# Patient Record
Sex: FEMALE | Race: WHITE | Employment: FULL TIME | URBAN - METROPOLITAN AREA
[De-identification: names, ages, dates, MRNs, and addresses within clinical notes are randomized per-mention and may not be internally consistent; named-entity substitution may affect disease eponyms.]

---

## 2017-06-21 PROCEDURE — 88305 TISSUE EXAM BY PATHOLOGIST: CPT | Performed by: RADIOLOGY

## 2017-11-07 PROCEDURE — 88305 TISSUE EXAM BY PATHOLOGIST: CPT

## 2018-05-14 PROBLEM — J30.1 SEASONAL ALLERGIC RHINITIS DUE TO POLLEN: Status: ACTIVE | Noted: 2018-05-14

## 2018-05-21 PROBLEM — H10.13 ALLERGIC CONJUNCTIVITIS OF BOTH EYES: Status: ACTIVE | Noted: 2018-05-21

## 2018-08-01 PROBLEM — J30.9 ALLERGIC RHINITIS, UNSPECIFIED SEASONALITY, UNSPECIFIED TRIGGER: Status: ACTIVE | Noted: 2018-08-01

## 2018-10-01 PROBLEM — M25.511 ACUTE PAIN OF RIGHT SHOULDER: Status: ACTIVE | Noted: 2018-10-01

## 2018-11-12 PROCEDURE — 86334 IMMUNOFIX E-PHORESIS SERUM: CPT | Performed by: PEDIATRICS

## 2018-11-12 PROCEDURE — 83883 ASSAY NEPHELOMETRY NOT SPEC: CPT | Performed by: PEDIATRICS

## 2018-11-12 PROCEDURE — 84165 PROTEIN E-PHORESIS SERUM: CPT | Performed by: PEDIATRICS

## 2018-11-12 PROCEDURE — 86161 COMPLEMENT/FUNCTION ACTIVITY: CPT | Performed by: PEDIATRICS

## 2018-11-12 PROCEDURE — 82785 ASSAY OF IGE: CPT | Performed by: PEDIATRICS

## 2018-11-12 PROCEDURE — 86003 ALLG SPEC IGE CRUDE XTRC EA: CPT | Performed by: PEDIATRICS

## 2018-11-12 PROCEDURE — 83520 IMMUNOASSAY QUANT NOS NONAB: CPT | Performed by: PEDIATRICS

## 2018-11-12 PROCEDURE — 86160 COMPLEMENT ANTIGEN: CPT | Performed by: PEDIATRICS

## 2018-12-03 PROCEDURE — 86003 ALLG SPEC IGE CRUDE XTRC EA: CPT | Performed by: PEDIATRICS

## 2018-12-03 PROCEDURE — 36415 COLL VENOUS BLD VENIPUNCTURE: CPT | Performed by: PEDIATRICS

## 2020-11-20 PROCEDURE — 88305 TISSUE EXAM BY PATHOLOGIST: CPT | Performed by: INTERNAL MEDICINE

## 2020-11-23 PROBLEM — D12.3 ADENOMATOUS POLYP OF TRANSVERSE COLON: Status: ACTIVE | Noted: 2020-11-23

## 2021-02-16 PROBLEM — N39.0 POSTCOITAL UTI: Status: ACTIVE | Noted: 2021-02-16

## 2021-02-16 PROBLEM — J45.40 MODERATE PERSISTENT ASTHMA WITHOUT COMPLICATION (HCC): Status: ACTIVE | Noted: 2021-02-16

## 2021-02-16 PROBLEM — J45.40 MODERATE PERSISTENT ASTHMA WITHOUT COMPLICATION: Status: ACTIVE | Noted: 2021-02-16

## 2024-02-21 ENCOUNTER — NURSE ONLY (OUTPATIENT)
Dept: HEMATOLOGY/ONCOLOGY | Facility: HOSPITAL | Age: 54
End: 2024-02-21
Attending: GENETIC COUNSELOR, MS
Payer: COMMERCIAL

## 2024-02-21 ENCOUNTER — GENETICS ENCOUNTER (OUTPATIENT)
Dept: GENETICS | Facility: HOSPITAL | Age: 54
End: 2024-02-21
Attending: GENETIC COUNSELOR, MS
Payer: COMMERCIAL

## 2024-02-21 DIAGNOSIS — Z80.3 FAMILY HISTORY OF BREAST CANCER IN FIRST DEGREE RELATIVE: Primary | ICD-10-CM

## 2024-02-21 DIAGNOSIS — Z80.42 FAMILY HISTORY OF MALIGNANT NEOPLASM OF PROSTATE: ICD-10-CM

## 2024-02-21 DIAGNOSIS — Z84.81 FAMILY HISTORY OF GENETIC DISEASE CARRIER: ICD-10-CM

## 2024-02-21 PROCEDURE — 36415 COLL VENOUS BLD VENIPUNCTURE: CPT

## 2024-02-21 PROCEDURE — 96040 HC GENETIC COUNSELING EA 30 MIN: CPT | Performed by: GENETIC COUNSELOR, MS

## 2024-02-22 PROBLEM — Z84.81 FAMILY HISTORY OF GENETIC DISEASE CARRIER: Status: ACTIVE | Noted: 2024-02-22

## 2024-02-22 PROBLEM — Z80.42 FAMILY HISTORY OF MALIGNANT NEOPLASM OF PROSTATE: Status: ACTIVE | Noted: 2024-02-22

## 2024-02-22 NOTE — PROGRESS NOTES
Reason for visit: Mrs. Juan is a 53-year-old woman who has a family history of a BRCA1 or BRCA2 mutation which was identified in 2006.  She was tested for this known mutation and was found to be negative but more recently, her brother had a testing panel and was found to harbor a CHEK2 mutation.  She has a significant history of cancer on both sides of her family.  She is now interested in pursuing genetic testing for the familial mutation in order to guide her medical care. Mrs. Juan is  and is employed as a pharmacist.  She and her  have four adult children together.  Relevant family history:  Mrs. Juan shares that one of her sisters was diagnosed with breast cancer in 2003, at age 42, and underwent genetic testing.  Her father was also found to have prostate cancer around this time and both her father and this sister had genetic testing for BRCA1/2 through [facility] in ~2004 and a VUS was identified.  This was upgraded in 2006 and Mrs. Juan and other family members elected to have site-specific testing at this point which identified that two other brothers had the mutation and that she was negative.  More recently, a brother with the known BRCA mutation was diagnosed with prostate cancer and was tested with a panel test which yielded a CHEK2 mutation in addition to the BRCA mutation.  Unfortunately, no reports were available for review, but she is a very good historian.  The cancers on her paternal side consistent with the BRCA mutation include her paternal grandmother who passed away from breast cancer in her 40's and two paternal great aunts (sisters of her grandmother) who passed away from malignancies; one with breast cancer and one with ovarian cancer.  In addition to her sister with breast cancer diagnosed at age 42 and her brother with prostate cancer, another sister was diagnosed with breast cancer at age 46 (negative for the familial BRCA mutation) and a niece has recently been  diagnosed with breast cancer at age 25 and is BRCA+.  On her maternal side, a maternal cousin passed away from liver cancer and another was diagnosed with colon cancer at age 50 and a maternal aunt was diagnosed with colon cancer.  Aside from the aforementioned genetic testing, she is not aware of any other family members who have pursued testing to date. Her heritage is Faroese on both sides of her family and is unaware of any Ashkenazi Evangelical heritage or consanguinity.  All four of her children (two daughters and two sons) are healthy and well by her report.  Medical history: Mrs. Juan reports that she is in good health.  She has her ovaries intact and is post-menopausal, having undergone a hysterectomy at age 38.  She has had a colonoscopy with several benign polyps removed.  She has been consistent with her breast imaging and has a history of 5 or 6 benign breast biopsies.  She does not have any current breast-related complaints.  She denies any history of thyroid issues or dermatological concerns but does have a personal history of uterine fibroids.  She was 12 at menarche and was 28 at the birth of her eldest children (twins).  She denies any use of hormone replacement therapy but does admit to 10 years of oral contraceptive use.    Summary: We discussed hereditary breast cancer with Mrs. Juan because of her family history.  Most breast and/or ovarian cancer is not hereditary; however, hereditary cancer is suspected under certain conditions, such as when breast cancer occurs prior to the age of 50 (or premenopausal), when there are multiple affected family members, when breast cancer occurs in combination with other cancers, especially ovarian cancer, and when cancer appears to be passing from generation to generation, or when an individual has more than one cancer diagnosed.  We reviewed how much has changed with genetic testing since her family was originally tested, especially with panels becoming  available in 2014.  We discussed dominant inheritance, the pattern in which most hereditary cancer conditions are inherited.  If an individual has such a cancer predisposing gene mutation, there is a 50% chance that any offspring will not inherit the mutation and a 50% chance that he or she will inherit it.  Inheriting the mutation does not automatically mean that one will develop cancer, rather that there is an increased chance for developing certain types of cancer.  Cancer predisposing gene mutations can exist in males and females and can be passed on to both male and female offspring.  Given the available information about her brother with a reported CHEK2 mutation, this gives her a 50% chance of being a carrier of the same mutation.    This information would directly impact her medical management, as she has her breast tissue remaining and her ovaries are intact.  The pros and cons of cancer predisposing gene mutation testing were reviewed with Mrs. Juan.  Genetic test results can have significant impact on medical management, planning, screening, surgical decision-making, cancer risk assessment for the patient and relatives, and psychological/emotional well-being.  Mutations in the genes BRCA1 and BRCA2 account for most (but not all) cases of hereditary breast or ovarian cancer.  Mutations in other genes have also been associated with an increased risk for breast cancer - but mutations in these other genes are statistically less often seen in hereditary breast cancer.    We discussed the benefits and limitations of testing for only the known familial CHEK2 mutation vs. comprehensive CHEK2 testing versus multi-gene panels that include CHEK2.  Panels are an appropriate option for individuals whose history is suggestive of more than one syndrome, and they improve detection rate for identifying the underlying cause of a hereditary cancer.  Limitations of panels include an unknown percentage of variants of  unknown significance, as well as an uncertainty of level of risk associated with certain unknown-penetrance genes, and therefore lack of clear guidelines for risk management of carriers of some of these mutations.  There are panels that assess for mutations in only “high risk” and clinically actionable breast cancer genes as well as larger panels that assess for mutations in high, moderate and unknown-penetrance cancer genes.  Genetic testing for either comprehensive CHEK2 analysis or a panel could yield one of three results: no mutation detected, deleterious mutation detected, or variant of uncertain significance.  The implications of these potential results were reviewed with Mrs. Juan.  Conversely, testing for only the known familial mutation would yield either a positive (mutation detected) or negative (no mutation detected) result. However, as she was unable to provide a report from her brother, minimally, CHEK2 comprehensive testing should be performed.    Additionally, we discussed the federal statutes protecting genetic information and non-discrimination for health insurance or employment (LEON) but we reviewed that life insurance, long term healthcare and disability benefits are not covered by these laws, and therefore, should she be interested in obtaining coverage for any of these, she should do this prior to the testing.  Given the reported familial CHEK2 mutation and a significant maternal family history of early-onset colon cancer, Mrs. Juan meets NCCN criteria for genetic testing.  After discussing the multiple testing options, Mrs. Juan decided that she would like to pursue testing for a comprehensive panel of testing with RNA analysis ([facility] Multi-Cancer Panel+RNA, 70 genes).  The blood was drawn today and sent to [facility].  Turn-around-time is approximately one to two weeks for the testing.  Our office will call Mrs. Juan as soon as results are received; post-test counseling can be  scheduled at that time.   Given the known familial BRCA mutation, resources for her family were shared including [facility] Cueto, [facility] and [facility].    Plan:  Ordered [facility] Multi[facility] Panel+RNA through [facility].  The Genetics office will call Mrs. Juan when results are received.  Post-test counseling can be scheduled at that time.  Recommendations for Mrs. Juan and family members will depend on above test results.  Thank you for referring Mrs. Juan for genetic counseling; please do not hesitate to contact our office if you have any questions or concerns, [phone].  Send to: Srinivas Butts MD  Time spent with patient: 60 minutes

## 2024-02-29 ENCOUNTER — TELEPHONE (OUTPATIENT)
Dept: GENETICS | Facility: HOSPITAL | Age: 54
End: 2024-02-29

## 2024-02-29 NOTE — TELEPHONE ENCOUNTER
Shared genetic testing results with Melyssa, which are negative for 70 gene panel+RNA aside from VUS in POLE (c.[id number]>T), which will not change her clinical management. She does not have the familial BRCA2 mutation or the familial CHEK2 mutation. Testing was performed by [facility] ([facility] Multi-Cancer Panel+RNA). Released results to her through lab's portal and will mail her a copy. Encouraged her to reach out to me with any questions. All her questions were answered to the best of my ability and she was appreciative of the call and reassured by the results.

## 2025-04-23 ENCOUNTER — LABORATORY ENCOUNTER (OUTPATIENT)
Dept: LAB | Age: 55
End: 2025-04-23
Attending: SURGERY
Payer: COMMERCIAL

## 2025-04-23 DIAGNOSIS — K57.92 DIVERTICULITIS: ICD-10-CM

## 2025-04-23 LAB
ANION GAP SERPL CALC-SCNC: 10 MMOL/L (ref 0–18)
BUN BLD-MCNC: 15 MG/DL (ref 9–23)
CALCIUM BLD-MCNC: 9.6 MG/DL (ref 8.7–10.6)
CHLORIDE SERPL-SCNC: 106 MMOL/L (ref 98–112)
CO2 SERPL-SCNC: 25 MMOL/L (ref 21–32)
CREAT BLD-MCNC: 0.88 MG/DL (ref 0.55–1.02)
EGFRCR SERPLBLD CKD-EPI 2021: 78 ML/MIN/1.73M2 (ref 60–?)
ERYTHROCYTE [DISTWIDTH] IN BLOOD BY AUTOMATED COUNT: 13 %
FASTING STATUS PATIENT QL REPORTED: YES
GLUCOSE BLD-MCNC: 86 MG/DL (ref 70–99)
HCT VFR BLD AUTO: 40.4 % (ref 35–48)
HGB BLD-MCNC: 13.2 G/DL (ref 12–16)
MCH RBC QN AUTO: 28.9 PG (ref 26–34)
MCHC RBC AUTO-ENTMCNC: 32.7 G/DL (ref 31–37)
MCV RBC AUTO: 88.4 FL (ref 80–100)
OSMOLALITY SERPL CALC.SUM OF ELEC: 292 MOSM/KG (ref 275–295)
PLATELET # BLD AUTO: 172 10(3)UL (ref 150–450)
POTASSIUM SERPL-SCNC: 4.4 MMOL/L (ref 3.5–5.1)
RBC # BLD AUTO: 4.57 X10(6)UL (ref 3.8–5.3)
SODIUM SERPL-SCNC: 141 MMOL/L (ref 136–145)
WBC # BLD AUTO: 5.3 X10(3) UL (ref 4–11)

## 2025-04-23 PROCEDURE — 36415 COLL VENOUS BLD VENIPUNCTURE: CPT

## 2025-04-23 PROCEDURE — 80048 BASIC METABOLIC PNL TOTAL CA: CPT

## 2025-04-23 PROCEDURE — 85027 COMPLETE CBC AUTOMATED: CPT

## 2025-04-30 ENCOUNTER — HOSPITAL ENCOUNTER (INPATIENT)
Facility: HOSPITAL | Age: 55
LOS: 2 days | Discharge: HOME | DRG: 330 | End: 2025-05-02
Attending: SURGERY | Admitting: SURGERY
Payer: COMMERCIAL

## 2025-04-30 ENCOUNTER — ANESTHESIA EVENT (OUTPATIENT)
Dept: SURGERY | Facility: HOSPITAL | Age: 55
DRG: 330 | End: 2025-04-30
Payer: COMMERCIAL

## 2025-04-30 ENCOUNTER — ANESTHESIA (OUTPATIENT)
Dept: SURGERY | Facility: HOSPITAL | Age: 55
DRG: 330 | End: 2025-04-30
Payer: COMMERCIAL

## 2025-04-30 DIAGNOSIS — K57.92 DIVERTICULITIS: Primary | ICD-10-CM

## 2025-04-30 LAB — GLUCOSE BLD-MCNC: 260 MG/DL (ref 70–99)

## 2025-04-30 PROCEDURE — 82962 GLUCOSE BLOOD TEST: CPT

## 2025-04-30 PROCEDURE — 0DTNFZZ RESECTION OF SIGMOID COLON, VIA NATURAL OR ARTIFICIAL OPENING WITH PERCUTANEOUS ENDOSCOPIC ASSISTANCE: ICD-10-PCS | Performed by: SURGERY

## 2025-04-30 PROCEDURE — 8E0Y4CZ ROBOTIC ASSISTED PROCEDURE OF LOWER EXTREMITY, PERCUTANEOUS ENDOSCOPIC APPROACH: ICD-10-PCS | Performed by: SURGERY

## 2025-04-30 PROCEDURE — 88307 TISSUE EXAM BY PATHOLOGIST: CPT | Performed by: SURGERY

## 2025-04-30 RX ORDER — HYDROCODONE BITARTRATE AND ACETAMINOPHEN 5; 325 MG/1; MG/1
1 TABLET ORAL ONCE AS NEEDED
Status: DISCONTINUED | OUTPATIENT
Start: 2025-04-30 | End: 2025-04-30 | Stop reason: HOSPADM

## 2025-04-30 RX ORDER — HYDROMORPHONE HYDROCHLORIDE 1 MG/ML
0.4 INJECTION, SOLUTION INTRAMUSCULAR; INTRAVENOUS; SUBCUTANEOUS EVERY 5 MIN PRN
Status: DISCONTINUED | OUTPATIENT
Start: 2025-04-30 | End: 2025-04-30 | Stop reason: HOSPADM

## 2025-04-30 RX ORDER — MIDAZOLAM HYDROCHLORIDE 1 MG/ML
INJECTION INTRAMUSCULAR; INTRAVENOUS
Status: COMPLETED
Start: 2025-04-30 | End: 2025-04-30

## 2025-04-30 RX ORDER — FAMOTIDINE 20 MG/1
20 TABLET, FILM COATED ORAL 2 TIMES DAILY
Status: DISCONTINUED | OUTPATIENT
Start: 2025-04-30 | End: 2025-05-02

## 2025-04-30 RX ORDER — ENOXAPARIN SODIUM 100 MG/ML
40 INJECTION SUBCUTANEOUS DAILY
Status: DISCONTINUED | OUTPATIENT
Start: 2025-05-01 | End: 2025-05-02

## 2025-04-30 RX ORDER — ACETAMINOPHEN 500 MG
1000 TABLET ORAL EVERY 8 HOURS PRN
Status: DISCONTINUED | OUTPATIENT
Start: 2025-04-30 | End: 2025-05-01

## 2025-04-30 RX ORDER — SENNOSIDES 8.6 MG
17.2 TABLET ORAL NIGHTLY PRN
Status: DISCONTINUED | OUTPATIENT
Start: 2025-04-30 | End: 2025-05-02

## 2025-04-30 RX ORDER — MEPERIDINE HYDROCHLORIDE 25 MG/ML
12.5 INJECTION INTRAMUSCULAR; INTRAVENOUS; SUBCUTANEOUS AS NEEDED
Status: DISCONTINUED | OUTPATIENT
Start: 2025-04-30 | End: 2025-04-30 | Stop reason: HOSPADM

## 2025-04-30 RX ORDER — ONDANSETRON 2 MG/ML
INJECTION INTRAMUSCULAR; INTRAVENOUS AS NEEDED
Status: DISCONTINUED | OUTPATIENT
Start: 2025-04-30 | End: 2025-04-30 | Stop reason: SURG

## 2025-04-30 RX ORDER — NEOSTIGMINE METHYLSULFATE 1 MG/ML
INJECTION INTRAVENOUS AS NEEDED
Status: DISCONTINUED | OUTPATIENT
Start: 2025-04-30 | End: 2025-04-30 | Stop reason: SURG

## 2025-04-30 RX ORDER — HEPARIN SODIUM 5000 [USP'U]/ML
INJECTION, SOLUTION INTRAVENOUS; SUBCUTANEOUS
Status: DISPENSED
Start: 2025-04-30 | End: 2025-04-30

## 2025-04-30 RX ORDER — ROCURONIUM BROMIDE 10 MG/ML
INJECTION, SOLUTION INTRAVENOUS AS NEEDED
Status: DISCONTINUED | OUTPATIENT
Start: 2025-04-30 | End: 2025-04-30 | Stop reason: SURG

## 2025-04-30 RX ORDER — NALOXONE HYDROCHLORIDE 0.4 MG/ML
0.08 INJECTION, SOLUTION INTRAMUSCULAR; INTRAVENOUS; SUBCUTANEOUS AS NEEDED
Status: DISCONTINUED | OUTPATIENT
Start: 2025-04-30 | End: 2025-04-30 | Stop reason: HOSPADM

## 2025-04-30 RX ORDER — SODIUM CHLORIDE, SODIUM LACTATE, POTASSIUM CHLORIDE, CALCIUM CHLORIDE 600; 310; 30; 20 MG/100ML; MG/100ML; MG/100ML; MG/100ML
INJECTION, SOLUTION INTRAVENOUS CONTINUOUS
Status: DISCONTINUED | OUTPATIENT
Start: 2025-04-30 | End: 2025-04-30 | Stop reason: HOSPADM

## 2025-04-30 RX ORDER — ACETAMINOPHEN 500 MG
1000 TABLET ORAL ONCE AS NEEDED
Status: DISCONTINUED | OUTPATIENT
Start: 2025-04-30 | End: 2025-04-30 | Stop reason: HOSPADM

## 2025-04-30 RX ORDER — ONDANSETRON 2 MG/ML
INJECTION INTRAMUSCULAR; INTRAVENOUS
Status: COMPLETED
Start: 2025-04-30 | End: 2025-04-30

## 2025-04-30 RX ORDER — HYDROMORPHONE HYDROCHLORIDE 1 MG/ML
0.2 INJECTION, SOLUTION INTRAMUSCULAR; INTRAVENOUS; SUBCUTANEOUS EVERY 5 MIN PRN
Status: DISCONTINUED | OUTPATIENT
Start: 2025-04-30 | End: 2025-04-30 | Stop reason: HOSPADM

## 2025-04-30 RX ORDER — BUPIVACAINE HYDROCHLORIDE 5 MG/ML
INJECTION, SOLUTION EPIDURAL; INTRACAUDAL; PERINEURAL AS NEEDED
Status: DISCONTINUED | OUTPATIENT
Start: 2025-04-30 | End: 2025-04-30 | Stop reason: HOSPADM

## 2025-04-30 RX ORDER — DIPHENHYDRAMINE HYDROCHLORIDE 50 MG/ML
12.5 INJECTION, SOLUTION INTRAMUSCULAR; INTRAVENOUS AS NEEDED
Status: DISCONTINUED | OUTPATIENT
Start: 2025-04-30 | End: 2025-04-30 | Stop reason: HOSPADM

## 2025-04-30 RX ORDER — HYDROMORPHONE HYDROCHLORIDE 1 MG/ML
0.4 INJECTION, SOLUTION INTRAMUSCULAR; INTRAVENOUS; SUBCUTANEOUS EVERY 2 HOUR PRN
Status: DISCONTINUED | OUTPATIENT
Start: 2025-04-30 | End: 2025-05-02

## 2025-04-30 RX ORDER — LIDOCAINE HYDROCHLORIDE ANHYDROUS AND DEXTROSE MONOHYDRATE .8; 5 G/100ML; G/100ML
INJECTION, SOLUTION INTRAVENOUS CONTINUOUS PRN
Status: DISCONTINUED | OUTPATIENT
Start: 2025-04-30 | End: 2025-04-30 | Stop reason: SURG

## 2025-04-30 RX ORDER — HEPARIN SODIUM 5000 [USP'U]/ML
5000 INJECTION, SOLUTION INTRAVENOUS; SUBCUTANEOUS ONCE
Status: COMPLETED | OUTPATIENT
Start: 2025-04-30 | End: 2025-04-30

## 2025-04-30 RX ORDER — SODIUM CHLORIDE, SODIUM LACTATE, POTASSIUM CHLORIDE, CALCIUM CHLORIDE 600; 310; 30; 20 MG/100ML; MG/100ML; MG/100ML; MG/100ML
1 INJECTION, SOLUTION INTRAVENOUS CONTINUOUS
Status: DISCONTINUED | OUTPATIENT
Start: 2025-04-30 | End: 2025-05-02

## 2025-04-30 RX ORDER — PROCHLORPERAZINE EDISYLATE 5 MG/ML
5 INJECTION INTRAMUSCULAR; INTRAVENOUS EVERY 8 HOURS PRN
Status: DISCONTINUED | OUTPATIENT
Start: 2025-04-30 | End: 2025-04-30 | Stop reason: HOSPADM

## 2025-04-30 RX ORDER — ONDANSETRON 2 MG/ML
4 INJECTION INTRAMUSCULAR; INTRAVENOUS EVERY 6 HOURS PRN
Status: DISCONTINUED | OUTPATIENT
Start: 2025-04-30 | End: 2025-04-30 | Stop reason: HOSPADM

## 2025-04-30 RX ORDER — HYDROCODONE BITARTRATE AND ACETAMINOPHEN 5; 325 MG/1; MG/1
2 TABLET ORAL ONCE AS NEEDED
Status: DISCONTINUED | OUTPATIENT
Start: 2025-04-30 | End: 2025-04-30 | Stop reason: HOSPADM

## 2025-04-30 RX ORDER — DEXAMETHASONE SODIUM PHOSPHATE 4 MG/ML
VIAL (ML) INJECTION AS NEEDED
Status: DISCONTINUED | OUTPATIENT
Start: 2025-04-30 | End: 2025-04-30 | Stop reason: SURG

## 2025-04-30 RX ORDER — HYDROMORPHONE HYDROCHLORIDE 1 MG/ML
0.6 INJECTION, SOLUTION INTRAMUSCULAR; INTRAVENOUS; SUBCUTANEOUS EVERY 5 MIN PRN
Status: DISCONTINUED | OUTPATIENT
Start: 2025-04-30 | End: 2025-04-30 | Stop reason: HOSPADM

## 2025-04-30 RX ORDER — METRONIDAZOLE 500 MG/100ML
500 INJECTION, SOLUTION INTRAVENOUS ONCE
Status: COMPLETED | OUTPATIENT
Start: 2025-04-30 | End: 2025-04-30

## 2025-04-30 RX ORDER — HYDROMORPHONE HYDROCHLORIDE 1 MG/ML
0.8 INJECTION, SOLUTION INTRAMUSCULAR; INTRAVENOUS; SUBCUTANEOUS EVERY 2 HOUR PRN
Status: DISCONTINUED | OUTPATIENT
Start: 2025-04-30 | End: 2025-05-02

## 2025-04-30 RX ORDER — OXYCODONE HYDROCHLORIDE 5 MG/1
5 TABLET ORAL EVERY 4 HOURS PRN
Status: DISCONTINUED | OUTPATIENT
Start: 2025-04-30 | End: 2025-05-02

## 2025-04-30 RX ORDER — MAGNESIUM OXIDE 400 MG/1
400 TABLET ORAL DAILY
Status: DISCONTINUED | OUTPATIENT
Start: 2025-04-30 | End: 2025-05-02

## 2025-04-30 RX ORDER — ACETAMINOPHEN 500 MG
1000 TABLET ORAL ONCE
Status: DISCONTINUED | OUTPATIENT
Start: 2025-04-30 | End: 2025-04-30 | Stop reason: HOSPADM

## 2025-04-30 RX ORDER — ONDANSETRON 2 MG/ML
4 INJECTION INTRAMUSCULAR; INTRAVENOUS EVERY 6 HOURS PRN
Status: DISCONTINUED | OUTPATIENT
Start: 2025-04-30 | End: 2025-05-02

## 2025-04-30 RX ORDER — GLYCOPYRROLATE 0.2 MG/ML
INJECTION, SOLUTION INTRAMUSCULAR; INTRAVENOUS AS NEEDED
Status: DISCONTINUED | OUTPATIENT
Start: 2025-04-30 | End: 2025-04-30 | Stop reason: SURG

## 2025-04-30 RX ORDER — HYDROMORPHONE HYDROCHLORIDE 1 MG/ML
INJECTION, SOLUTION INTRAMUSCULAR; INTRAVENOUS; SUBCUTANEOUS
Status: COMPLETED
Start: 2025-04-30 | End: 2025-04-30

## 2025-04-30 RX ORDER — MIDAZOLAM HYDROCHLORIDE 1 MG/ML
1 INJECTION INTRAMUSCULAR; INTRAVENOUS EVERY 5 MIN PRN
Status: DISCONTINUED | OUTPATIENT
Start: 2025-04-30 | End: 2025-04-30 | Stop reason: HOSPADM

## 2025-04-30 RX ORDER — VANCOMYCIN HYDROCHLORIDE
15 ONCE
Status: COMPLETED | OUTPATIENT
Start: 2025-04-30 | End: 2025-04-30

## 2025-04-30 RX ORDER — LABETALOL HYDROCHLORIDE 5 MG/ML
5 INJECTION, SOLUTION INTRAVENOUS EVERY 5 MIN PRN
Status: DISCONTINUED | OUTPATIENT
Start: 2025-04-30 | End: 2025-04-30 | Stop reason: HOSPADM

## 2025-04-30 RX ORDER — FAMOTIDINE 10 MG/ML
20 INJECTION, SOLUTION INTRAVENOUS 2 TIMES DAILY
Status: DISCONTINUED | OUTPATIENT
Start: 2025-04-30 | End: 2025-05-02

## 2025-04-30 RX ORDER — SODIUM CHLORIDE 9 MG/ML
INJECTION, SOLUTION INTRAVENOUS CONTINUOUS
Status: DISCONTINUED | OUTPATIENT
Start: 2025-04-30 | End: 2025-05-02

## 2025-04-30 RX ORDER — OXYCODONE HYDROCHLORIDE 10 MG/1
10 TABLET ORAL EVERY 4 HOURS PRN
Status: DISCONTINUED | OUTPATIENT
Start: 2025-04-30 | End: 2025-05-02

## 2025-04-30 RX ORDER — LIDOCAINE HYDROCHLORIDE 10 MG/ML
INJECTION, SOLUTION INFILTRATION; PERINEURAL AS NEEDED
Status: DISCONTINUED | OUTPATIENT
Start: 2025-04-30 | End: 2025-04-30 | Stop reason: HOSPADM

## 2025-04-30 RX ORDER — METOCLOPRAMIDE HYDROCHLORIDE 5 MG/ML
INJECTION INTRAMUSCULAR; INTRAVENOUS AS NEEDED
Status: DISCONTINUED | OUTPATIENT
Start: 2025-04-30 | End: 2025-04-30 | Stop reason: SURG

## 2025-04-30 RX ORDER — INDOCYANINE GREEN AND WATER 25 MG
KIT INJECTION AS NEEDED
Status: DISCONTINUED | OUTPATIENT
Start: 2025-04-30 | End: 2025-04-30 | Stop reason: SURG

## 2025-04-30 RX ORDER — POLYETHYLENE GLYCOL 3350 17 G/17G
17 POWDER, FOR SOLUTION ORAL DAILY PRN
Status: DISCONTINUED | OUTPATIENT
Start: 2025-04-30 | End: 2025-05-02

## 2025-04-30 RX ORDER — MIDAZOLAM HYDROCHLORIDE 1 MG/ML
INJECTION INTRAMUSCULAR; INTRAVENOUS AS NEEDED
Status: DISCONTINUED | OUTPATIENT
Start: 2025-04-30 | End: 2025-04-30 | Stop reason: SURG

## 2025-04-30 RX ORDER — LIDOCAINE HYDROCHLORIDE 10 MG/ML
INJECTION, SOLUTION EPIDURAL; INFILTRATION; INTRACAUDAL; PERINEURAL AS NEEDED
Status: DISCONTINUED | OUTPATIENT
Start: 2025-04-30 | End: 2025-04-30 | Stop reason: SURG

## 2025-04-30 RX ORDER — KETOROLAC TROMETHAMINE 30 MG/ML
30 INJECTION, SOLUTION INTRAMUSCULAR; INTRAVENOUS EVERY 6 HOURS
Status: DISCONTINUED | OUTPATIENT
Start: 2025-04-30 | End: 2025-05-01

## 2025-04-30 RX ADMIN — ONDANSETRON 4 MG: 2 INJECTION INTRAMUSCULAR; INTRAVENOUS at 08:03:00

## 2025-04-30 RX ADMIN — LIDOCAINE HYDROCHLORIDE 5 ML: 10 INJECTION, SOLUTION EPIDURAL; INFILTRATION; INTRACAUDAL; PERINEURAL at 07:39:00

## 2025-04-30 RX ADMIN — NEOSTIGMINE METHYLSULFATE 3 MG: 1 INJECTION INTRAVENOUS at 09:13:00

## 2025-04-30 RX ADMIN — METRONIDAZOLE 500 MG: 500 INJECTION, SOLUTION INTRAVENOUS at 07:42:00

## 2025-04-30 RX ADMIN — SODIUM CHLORIDE: 9 INJECTION, SOLUTION INTRAVENOUS at 07:33:00

## 2025-04-30 RX ADMIN — INDOCYANINE GREEN AND WATER 7.5 MG: 25 MG KIT INJECTION at 08:39:00

## 2025-04-30 RX ADMIN — DEXAMETHASONE SODIUM PHOSPHATE 4 MG: 4 MG/ML VIAL (ML) INJECTION at 07:42:00

## 2025-04-30 RX ADMIN — ROCURONIUM BROMIDE 10 MG: 10 INJECTION, SOLUTION INTRAVENOUS at 08:25:00

## 2025-04-30 RX ADMIN — MIDAZOLAM HYDROCHLORIDE 2 MG: 1 INJECTION INTRAMUSCULAR; INTRAVENOUS at 07:33:00

## 2025-04-30 RX ADMIN — SODIUM CHLORIDE: 9 INJECTION, SOLUTION INTRAVENOUS at 09:13:00

## 2025-04-30 RX ADMIN — LIDOCAINE HYDROCHLORIDE ANHYDROUS AND DEXTROSE MONOHYDRATE 2 MG/KG/HR: .8; 5 INJECTION, SOLUTION INTRAVENOUS at 07:50:00

## 2025-04-30 RX ADMIN — VANCOMYCIN HYDROCHLORIDE 1.25 G: at 07:33:00

## 2025-04-30 RX ADMIN — GLYCOPYRROLATE 0.4 MG: 0.2 INJECTION, SOLUTION INTRAMUSCULAR; INTRAVENOUS at 09:13:00

## 2025-04-30 RX ADMIN — ROCURONIUM BROMIDE 50 MG: 10 INJECTION, SOLUTION INTRAVENOUS at 07:39:00

## 2025-04-30 RX ADMIN — METOCLOPRAMIDE HYDROCHLORIDE 10 MG: 5 INJECTION INTRAMUSCULAR; INTRAVENOUS at 09:05:00

## 2025-04-30 NOTE — ANESTHESIA POSTPROCEDURE EVALUATION
[place]    Melyssa Juan Patient Status:  Inpatient   Age/Gender 54 year old female MRN [id number]   Location [facility] 3NW-A Attending Haris Morrison MD   Hosp Day # 0 PCP Adan Samano MD       Anesthesia Post-op Note    XI ROBOT-ASSISTED  LOW ANTERIOR COLON RESECTION, RIGID PROCTOSIGMOIDOSCOPY    Procedure Summary       Date: 04/30/25 Room / Location:  MAIN OR 09 /  MAIN OR    Anesthesia Start: 0733 Anesthesia Stop: 0923    Procedure: XI ROBOT-ASSISTED  LOW ANTERIOR COLON RESECTION, RIGID PROCTOSIGMOIDOSCOPY (Abdomen) Diagnosis: (DIVERTICULITIS)    Surgeons: Haris Morrison MD Anesthesiologist: Joel Mike MD    Anesthesia Type: general ASA Status: 2            Anesthesia Type: general    Vitals Value Taken Time   /72 04/30/25 10:24   Temp 99.2 °F (37.3 °C) 04/30/25 10:00   Pulse 76 04/30/25 10:32   Resp 7 04/30/25 10:32   SpO2 94 % 04/30/25 10:32   Vitals shown include unfiled device data.        Patient Location: PACU    Anesthesia Type: general    Airway Patency: patent and extubated    Postop Pain Control: adequate    Mental Status: preanesthetic baseline    Nausea/Vomiting: none    Cardiopulmonary/Hydration status: stable euvolemic    Complications: no apparent anesthesia related complications    Postop vital signs: stable    Dental Exam: Unchanged from Preop    Patient to be discharged from PACU when criteria met.

## 2025-04-30 NOTE — H&P
H&P Notes  - documented in this encounter   Haris Morrison MD - 2025 11:45 AM CST  Formatting of this note is different from the original.  Melyssa Juan is a 54 year old female. Patient presents with:  New Patient: Diverticulitis      No ref. provider found    HPI:  Patient presents with several episodes of acute diverticulitis involving her sigmoid colon. She has had 4 episodes in the last 2 years. She most recently had a severe episode requiring antibiotics for several days. She underwent a CT scan on 2025 and was found of acute diverticulitis of the sigmoid colon. She is feeling better after a weeks worth of oral antibiotics. Last colonoscopy was several years ago.    Past Medical History:  Diagnosis Date  Adenomatous polyp of transverse colon 2020  Colonoscopy 2020  Allergic rhinitis 08/10/2012  Asthma  ALLERGY INDUCED  BRCA1 negative [id number]  BRCA2 negative [id number]  COVID  3/2020, 2021  Family history of breast cancer in first degree relative 08/10/2012  Family history of colon cancer 08/10/2012  Moderate persistent asthma without complication 2021  Postcoital UTI 2021    Past Surgical History:  Procedure Laterality Date    CHOLECYSTECTOMY  COLONOSCOPY N/A 2020  Procedure: COLONOSCOPY, POSSIBLE BIOPSY, POSSIBLE POLYPECTOMY 24907; Surgeon: Moshe Sweeney MD; Location: [facility]  COLONOSCOPY N/A 2023  Procedure: COLONOSCOPY,; Surgeon: Moshe Sweeney MD; Location: [facility]  COLONOSCOPY FLX DX W/COLLJ SPEC WHEN PFRMD N/A 2015  Procedure: COLONOSCOPY, POSSIBLE BIOPSY, POSSIBLE POLYPECTOMY 65297; Surgeon: Moshe Sweeney MD; Location: [facility]  COLONOSCOPY STOMA DX INCLUDING COLLJ SPEC SPX 12= Diverticulosis, Hemorrhoids  Repeat   COLONOSCOPY STOMA DX INCLUDING COLLJ SPEC SPX 11/11/15= Diverticulosis  Repeat   COLONOSCOPY STOMA DX INCLUDING COLLJ SPEC SPX 2020  Diverticulosis,  Polyp (TA) Repeat 2023 Needs MAC  COLONOSCOPY STOMA DX INCLUDING COLLJ SPEC SPX 03/14/2023  Diverticulosis Repeat 2026 Needs MAC  CYSTO W/INSERT URETERAL STENT  EXC CYST/ABERRANT BREAST TISSUE OPEN 1/> LESION Right 06/09/2015  Procedure: BIOPSY / LUMPECTOMY BREAST; Surgeon: Srinivas Butts MD; Location: [facility]  EXCISIONAL BIOPSY LEFT 11/2017  No evidence of malignancy  EXCISIONAL BIOSPY RIGHT 02/2013  Fibroadenoma, No evidence of malignancy  HYSTERECTOMY  NEEDLE BIOPSY LEFT 06/21/2017  No evidence of malignancy  NEEDLE BIOPSY LEFT 1998  neg  NEEDLE BIOPSY RIGHT  OTHER SURGICAL HISTORY 01/24/2022  Sling Dr. Acosta    Family History  Problem Relation Age of Onset  Heart Disorder Mother  Lipids Mother  Hypertension Mother  Cancer Father  Prostate  Breast Cancer Sister 46  age at dx 46  Breast Cancer Sister 48  age at dx 48  Cancer Brother  Breast Cancer Paternal Grandmother 40  age at dx 40  Cancer Paternal Grandmother 46  Breast  Breast Cancer Maternal Cousin Female 45  age at dx 45  Breast Cancer Niece 25  25  Cancer Other  Breast  Cancer Other  ovary    Social History  Tobacco Use  Smoking status: Never  Smokeless tobacco: Never  Vaping Use  Vaping status: Never Used  Alcohol use: Yes  Comment: social  Drug use: No    Allergies:    Adhesive Tape OTHER (SEE COMMENTS)  Comment:Skin comes off USE PAPER TAPE  Ceclor RASH  Cephalosporins HIVES  Penicillins RASH  Sulfa Antibiotics RASH  Current Meds:  Current Outpatient Medications  Medication Sig Dispense Refill  levoFLOXacin 500 MG Oral Tab Take 1 tablet (500 mg total) by mouth daily. 10 tablet 0  clobetasol 0.05 % External Cream APPLY TO AFFECTED AREA TWICE A DAY 60 g 3  ciprofloxacin (CIPRO) 500 MG Oral Tab Take 1 tablet (500 mg total) by mouth 2 (two) times daily. 20 tablet 0  metRONIDAZOLE 500 MG Oral Tab Take 1 tablet (500 mg total) by mouth 3 (three) times daily. 30 tablet 0  nitrofurantoin monohydrate macro 100 MG Oral Cap Take 1 po after  intercourse to prevent UTI 20 capsule 3  hydrocortisone 2.5 % External Cream Place 1 Application rectally 2 (two) times daily. 30 g 1  Crisaborole (EUCRISA) 2 % External Ointment Apply 1 Application topically 2 (two) times daily. 1 each 3  Fluticasone-Salmeterol (ADVAIR DISKUS) 250-50 MCG/ACT Inhalation Aerosol Powder, Breath Activated Inhale 1 puff into the lungs 2 (two) times daily. 1 each 3  albuterol 108 (90 Base) MCG/ACT Inhalation Aero Soln Inhale 2 puffs into the lungs every 4 (four) hours as needed for Wheezing. 3 each 3  Betamethasone Dipropionate 0.05 % External Lotion APPLY TO AFFECTED AREAS ON SCALP ONCE DAILY FOR 7 DAYS THEN 2-3 DAYS WEEKLY 60 mL 3  DESONIDE 0.05 % External Ointment APPLY TWICE DAILY TO AFFECTED AREA ON FACE 60 g 1  Levocetirizine Dihydrochloride 5 MG Oral Tab Take 10 mg by mouth 2 (two) times daily.  EPINEPHrine (AUVI-Q) 0.3 MG/0.3ML Injection Solution Auto-injector Inject 0.3 mL (1 each total) as directed as needed. 4 each 1  Ipratropium Bromide 0.06 % Nasal Solution 2 sprays by Nasal route 3 (three) times daily. 1 Bottle 3  hydrocortisone (PROCTOSOL HC) 2.5 % Rectal Cream Place 1 Application rectally 2 (two) times daily. 30 g 3    ROS:  A comprehensive 14 point review of systems was completed. Pertinent positives and negatives noted in the the HPI.    EXAM:  GENERAL: well developed, well nourished, in no apparent distress  SKIN: no rashes, no suspicious lesions  EYES: PERRLA, EOMI, sclera anicteric, conjunctiva normal; fundi normal, there is no nystagmus  HEENT: normocephalic; normal nose, pharynx and TM's  NECK: supple; FROM; no JVD, no TMG, no carotid bruits  BREASTS: deferred  RESPIRATORY: clear to percussion and auscultation  CARDIOVASCULAR: S1, S2 normal, RRR; no S3, no S4; no click; murmur negative  ABDOMEN: normal active BS+, soft, nondistended; no HSM; no masses; no bruits; nontender  RECTAL: deferred  LYMPHATIC: no lymphadenopathy  MUSCULOSKELETAL: no acute synovitis upper  or lower extremity, no spinal tenderness  EXTREMITIES: no cyanosis, clubbing or edema, peripheral pulses intact  NEUROLOGIC: intact; no sensorimotor deficit; reflexes normal  PSYCHIATRIC: alert and oriented x 3; affect appropriate    IMPRESSION:  Recurrent acute diverticulitis involving the sigmoid colon.    PLAN:  We discussed the role of surgery in this condition. She may be a candidate for elective sigmoid colon resection. I have recommended a lower GI to further assess her colonic anatomy. The rationale risk benefits and alternatives of this approach were explained to her in detail. We did briefly discuss the robotic approach perioperative and postoperative expectations. All of her questions were answered. I have asked her to follow-up after imaging has been completed.    Thank you very much for your kind referrals    Electronically signed by Haris Morrison MD at 02/06/2025 12:59 PM CST    The above referenced H&P was reviewed by Haris Morrison MD on 4/30/2025, the patient was examined and no significant changes have occurred in the patient's condition since the H&P was performed.  Risks and benefits were discussed, proceed with procedure as planned.

## 2025-04-30 NOTE — PLAN OF CARE
Patient is drowsy arriving from PACU but otherwise alert and oriented. On room air. Abdomen is soft/ non-distended. Patient c/o nausea. Zofran given. Lap sites x4 closed with skin glue and transverse incision covered with aquacel-c/d/I. Poole catheter intact draining yellow urine. On clear/ERAS.

## 2025-04-30 NOTE — PROGRESS NOTES
NURSING ADMISSION NOTE      Patient admitted via  bed  Oriented to room.  Safety precautions initiated.  Bed in low position.  Call light in reach.    Patient arrived from PACU in stable condition.     2 person skin assessment completed with TODD Lu. Lap sites noted. Patient refused back assessment.

## 2025-04-30 NOTE — ANESTHESIA PROCEDURE NOTES
Airway  Date/Time: 4/30/2025 7:41 AM  Reason: elective      General Information and Staff   Patient location during procedure: OR  Anesthesiologist: Joel Mike MD  Performed: anesthesiologist   Performed by: Joel Mike MD  Authorized by: Joel Mike MD        Indications and Patient Condition  Indications for airway management: anesthesia  Sedation level: deep      Preoxygenated: yesPatient position: sniffing    Mask difficulty assessment: 1 - vent by mask    Final Airway Details    Final airway type: endotracheal airway    Successful airway: ETT  Cuffed: yes   Successful intubation technique: direct laryngoscopy  Endotracheal tube insertion site: oral  Blade: Florencio  Blade size: #3  ETT size (mm): 7.0    Cormack-Lehane Classification: grade I - full view of glottis  Placement verified by: capnometry   Measured from: lips  ETT to lips (cm): 22  Number of attempts at approach: 1

## 2025-04-30 NOTE — ANESTHESIA PREPROCEDURE EVALUATION
PRE-OP EVALUATION    Patient Name: Melyssa Juan    Admit Diagnosis: DIVERTICULITIS    Pre-op Diagnosis: DIVERTICULITIS    XI ROBOT-ASSISTED  LOW ANTERIOR COLON RESECTION POSSIBLE OPEN, RIGID PROCTOSIGMOIDOSCOPY    Anesthesia Procedure: XI ROBOT-ASSISTED  LOW ANTERIOR COLON RESECTION POSSIBLE OPEN, RIGID PROCTOSIGMOIDOSCOPY (Abdomen)    Surgeons and Role:     * Haris Morrison MD - Primary    Pre-op vitals reviewed.  Temp: 97.8 °F (36.6 °C)  Pulse: 70  Resp: 16  BP: 125/84  SpO2: 98 %  Body mass index is 28.29 kg/m².    Current medications reviewed.  Hospital Medications:  Current Medications[1]    Outpatient Medications:   Prescriptions Prior to Admission[2]    Allergies: Adhesive tape, Ceclor, Cephalosporins, Penicillins, and Sulfa antibiotics      Anesthesia Evaluation    Patient summary reviewed.    Anesthetic Complications  (+) history of anesthetic complications  History of: PONV       GI/Hepatic/Renal  Comment: Diverticulitis                                Cardiovascular        Exercise tolerance: good     MET: >4                                           Endo/Other    Negative endo/other ROS.                              Pulmonary      (+) asthma                     Neuro/Psych    Negative neuro/psych ROS.                                Past Surgical History[3]  Social Hx on file[4]  History   Drug Use No     Available pre-op labs reviewed.  Lab Results   Component Value Date    WBC 5.3 04/23/2025    RBC 4.57 04/23/2025    HGB 13.2 04/23/2025    HCT 40.4 04/23/2025    MCV 88.4 04/23/2025    MCH 28.9 04/23/2025    MCHC 32.7 04/23/2025    RDW 13.0 04/23/2025    .0 04/23/2025     Lab Results   Component Value Date     04/23/2025    K 4.4 04/23/2025     04/23/2025    CO2 25.0 04/23/2025    BUN 15 04/23/2025    CREATSERUM 0.88 04/23/2025    GLU 86 04/23/2025    CA 9.6 04/23/2025            Airway      Mallampati: II  Mouth opening: >3 FB  TM distance: > 6 cm    Cardiovascular    Cardiovascular exam normal.         Dental  Comment: Denies chipped or loose teeth.            Pulmonary    Pulmonary exam normal.                 Other findings        ASA: 2   Plan: general  NPO status verified and patient meets guidelines.          Plan/risks discussed with: patient            Present on Admission:  **None**               [1]  • [Transfer Hold] acetaminophen (Tylenol Extra Strength) tab 1,000 mg  1,000 mg Oral Once   • sodium chloride 0.9% infusion   Intravenous Continuous   • [COMPLETED] heparin (Porcine) 5000 UNIT/ML injection 5,000 Units  5,000 Units Subcutaneous Once   • [COMPLETED] vancomycin (Vancocin) 1.25 g in sodium chloride 0.9% 250mL IVPB premix  15 mg/kg Intravenous Once    And   • gentamicin (Garamycin) 340 mg in sodium chloride 0.9% 100 mL IVPB  5 mg/kg (Adjusted) Intravenous Once    And   • [COMPLETED] metroNIDAZOLE in sodium chloride 0.79% (Flagyl) 5 mg/mL IVPB premix 500 mg  500 mg Intravenous Once   • heparin (Porcine) 5000 UNIT/ML injection       • bupivacaine PF (Marcaine) 0.5% injection    PRN   • lidocaine (Xylocaine) 1 % injection    PRN   [2]  Medications Prior to Admission   Medication Sig Dispense Refill Last Dose/Taking   • Levocetirizine Dihydrochloride 5 MG Oral Tab Take 2 tablets (10 mg total) by mouth in the morning and 2 tablets (10 mg total) before bedtime.   4/16/2025   • CLOBETASOL PROPIONATE 0.05 % External Cream APPLY 1 APPLICATION TOPICALLY 2 (TWO) TIMES DAILY. 60 g 5 Past Month   • Hydrocortisone (PROCTOSOL HC) 2.5 % External Cream Place 1 Application rectally 2 (two) times daily. (Patient taking differently: Place 1 Application  rectally as needed.) 30 g 3 More than a month   • Nitrofurantoin Monohyd Macro 100 MG Oral Cap Take 1 po after intercourse to prevent UTI 20 capsule 3 More than a month   • EPINEPHrine (AUVI-Q) 0.3 MG/0.3ML Injection Solution Auto-injector Inject 0.3 mL (1 each total) as directed as needed. (Patient not taking:  Reported on 2025) 4 each 1    • Albuterol Sulfate  (90 Base) MCG/ACT Inhalation Aero Soln Inhale 2 puffs into the lungs every 4 (four) hours as needed for Wheezing. 3 Inhaler 3 More than a month   [3]  Past Surgical History:  Procedure Laterality Date   •      • Cholecystectomy     • Colonoscopy  12= Diverticulosis, Hemorrhoids    Repeat    • Colonoscopy  11/11/15= Diverticulosis    Repeat    • Colonoscopy  2020    Diverticulosis, Polyp (TA)  Repeat   Needs MAC   • Colonoscopy N/A 2020    Procedure: COLONOSCOPY, POSSIBLE BIOPSY, POSSIBLE POLYPECTOMY 20196;  Surgeon: Moshe Sweeney MD;  Location: [facility]   • Colonoscopy,diagnostic N/A 2015    Procedure: COLONOSCOPY, POSSIBLE BIOPSY, POSSIBLE POLYPECTOMY 65544;  Surgeon: Moshe Sweeney MD;  Location: [facility]   • Excise breast lesion Right 2015    Procedure: BIOPSY / LUMPECTOMY BREAST;  Surgeon: Srinivas Butts MD;  Location: [facility]   • Excisional biopsy left  2017    No evidence of malignancy   • Excisional biospy right  2013    Fibroadenoma, No evidence of malignancy   • Hysterectomy     • Lumpectomy left     • Lumpectomy right     • Needle biopsy left  2017    No evidence of malignancy   • Needle biopsy left      neg   • Other surgical history  2022    Sling Dr. Acosta    [4]  Social History  Socioeconomic History   • Marital status:    Tobacco Use   • Smoking status: Never   • Smokeless tobacco: Never   Vaping Use   • Vaping status: Never Used   Substance and Sexual Activity   • Alcohol use: Yes     Comment: social   • Drug use: No

## 2025-04-30 NOTE — OPERATIVE REPORT
[facility]                                                         Operative Note    Melyssa Juan Location: [facility] Perioperative   CSN [id number] MRN [id number]   Admission Date 4/30/2025 Procedure Date 4/30/2025   Attending Physician Haris Morrison MD Procedure Physician Haris Morrison MD     Pre-Operative Diagnosis: DIVERTICULITIS     Post-Operative Diagnosis: DIVERTICULITIS    Procedure Performed: XI ROBOT-ASSISTED  LOW ANTERIOR COLON RESECTION, RIGID PROCTOSIGMOIDOSCOPY    Surgeon: Haris Morrison MD     Assistant(s):  Surgical Assistant.: Jade Garcia RSA    The surgical Assistant was necessary for this procedure.  The assistant was involved in patient positioning, instrument exchange, improving exposure optimizing visualization of patient's safety, and closure.  The duties of the assistant allowed for reduced risk of the performance of this procedure and care of this patient         Anesthesia: General       Complications: none       Estimated Blood Loss: Blood Output: 10 mL (4/30/2025  9:06 AM)              Operative Summary: Patient was taken to the operating room placed in a lithotomy position.  They were prepped and draped in usual fashion after being placed under general anesthesia.  An incision was made to the right of midline at the level of the umbilicus and dissection was carried down to the fascia.  Fascia was incised exposing the rectus abdominis musculature.  A muscle-splitting technique was utilized to expose the peritoneum.  Peritoneum was opened and the abdomen was entered.  An Roscoe retractor was placed in the abdomen was insufflated with 15 mm of carbon dioxide after the yellow cath was placed.  12 mm trocar was placed in the right lower quadrant followed by 2 additional 8 mm trochars in the upper abdomen in the line of a diagonal.  A 5 mm assistant port was placed in the right mid quadrant.  The additional trochars were placed under direct laparoscopic view.  Patient was then  placed in a steep Trendelenburg position.  Small bowel was retrieved out of the pelvis.  The excised da Luis robot was brought into position and docked.  Instrumentation was placed.  Patient was found to have an inflammatory reaction involving the sigmoid colon consistent with their preoperative history of diverticulitis.  Proximal to the area of disease was mobilized laterally to and around the splenic flexure.  The medial dissection was then encountered by incising the mesentery to the distal colon exposing the inferior mesenteric artery vascular bundle.  Windows were created on either side of this and then this was ligated with the vessel sealer.  Windows were created followed by ligation of the vasculature to this abnormal segment of colon to the rectosigmoid junction.  This allowed exposure of the retroperitoneum keeping the ureter and pelvic vascular out of harm's way.  Once this was accomplished a lateral to medial dissection occurred mobilizing the colon away from the left lateral sidewall down to the distal region of the sigmoid colon and to the rectosigmoid junction.  Colon was divided at the junction with the 45 mm sure form.  Dissection was then carried medial to the area of disease to an area of colon which appeared to be healthy.  Immunofluorescence was utilized to appreciate the demarcation of the bowel viability in this area.  Once this was accomplished a 29 mm anvil was introduced to the abdominal cavity.  An enterotomy was made on the specimen side of the demarcated bowel as well as a small enterotomy on the proximal end.  A 29 mm anvil was then advanced through this enterotomy and exited out of the proximal colon.  Specimen enterotomy was closed with a 3 oh VueLock suture followed by dividing the colon distal to the exit site of the anvil.  Specimen was set aside for future removal.  Colon was then brought down to the pelvis.  There is no tension present.  The rectal remnant was then serially  dilated followed by advancing the EEA stapling device into the rectal remnant and exiting the spike out the staple line.  An end-to-end anastomosis was then performed in this location.  The donuts were inspected and appeared to be intact.  A rigid proctoscopy was then performed insufflating air up into the rectal remnant.  Saline was infused in the pelvis and no gross leaks or air bubbles were appreciated.  Once the rigid proctoscope was removed the anterior staple line was reinforced with interrupted 3-0 silk sutures.  Hemostasis achieved.  Specimen was retrieved from the abdominal cavity through the Roscoe port.  This was followed by closing the 12 mm trocar site in the right lower quadrant with an 0 Vicryl suture at the fascial level.  Trochars were removed.  Larger wound near the umbilicus was reapproximated in 2 layers by first reapproximating the peritoneum with an 0 Vicryl suture followed by closure of the fascia with an 0 PDS.  Remainder the wounds were closed with Monocryl.  Dermabond was placed on the skin.  Patient was awoken taken recovery room in satisfactory condition.  There are no complications during the case          Haris Morrison MD  4/30/2025  9:19 AM

## 2025-04-30 NOTE — H&P
[facility] Hospitalist History and Physical     PCP: Adan Samano MD      Chief Complaint: post op     History of Present Illness: Patient is a 54 year old female with hx of asthma, hx of diverticulitis, here for scheduled surgery.  Pt is s/p robot assisted low anterior colon resecction right protcosigmoidoscopy on 4/30.  Post op pt feels okay, no chest pain no SOB, states pain is controlled.  No hx of CECELIA.          Current Meds  Scheduled Meds: Scheduled Medications[1]  Continuous Infusions: Medication Infusions[2]  PRN Meds: PRN Medications[3]    Allergies[4]     Past Medical History[5]   Past Surgical History[6]   Social History     Tobacco Use    Smoking status: Never    Smokeless tobacco: Never   Substance Use Topics    Alcohol use: Yes     Comment: social                Intake/Output:  No intake/output data recorded.  Wt Readings from Last 3 Encounters:   04/30/25 180 lb (81.6 kg)   01/24/22 186 lb (84.4 kg)   01/11/22 180 lb (81.6 kg)         Exam:  [unfilled]  Temp:  [97.2 °F (36.2 °C)-99.2 °F (37.3 °C)] 98.1 °F (36.7 °C)  Pulse:  [61-80] 77  Resp:  [10-19] 15  BP: (109-135)/(69-84) 111/73  SpO2:  [93 %-98 %] 95 %  General:  Alert, no distress, appears stated age.   Head:  Normocephalic, without obvious abnormality, atraumatic.    Eyes:  Sclera anicteric, No conjunctival pallor, EOMs intact.    Throat: MMM     Neck: Supple, symmetrical, trachea midline    Lungs:   Clear to auscultation bilaterally. Normal effort    Chest wall:  No tenderness or deformity.   Heart:  Regular rate and rhythm, no peripheral edema    Abdomen:   Soft, NT/ND, +bs    MSK: Atraumatic, no cyanosis or edema.    Skin: No visible rashes or lesions.     Neurologic: Normal strength, no focal deficit appreciated            Labs:                    Assessment/Plan:  Patient is a 54 year old female with hx of asthma, hx of diverticulitis, here for scheduled surgery.  Pt is s/p robot assisted low anterior colon resecction right protcosigmoidoscopy on  .          Plan:    Post op pain  - Iv pain meds, switch to orals as able  - diet per surgery   - dvt ppx when ok with surgery     Asthma  - home meds  - no s/s of exacerbation        Prophylaxis:  DVT: SCD          Shiloh Vargas MD             [1]    heparin        famotidine  20 mg Oral BID    Or    famotidine  20 mg Intravenous BID    magnesium oxide  400 mg Oral Daily    [START ON 2025] enoxaparin  40 mg Subcutaneous Daily    ketorolac  30 mg Intravenous Q6H    ondansetron       [2]    sodium chloride 20 mL/hr at 25 0635    lactated ringers     [3]   heparin    polyethylene glycol (PEG 3350)    sennosides    oxyCODONE **OR** oxyCODONE    HYDROmorphone **OR** HYDROmorphone    ondansetron    ondansetron  [4]   Allergies  Allergen Reactions    Adhesive Tape OTHER (SEE COMMENTS)     Skin comes off USE PAPER TAPE     Ceclor RASH    Cephalosporins HIVES    Penicillins RASH    Sulfa Antibiotics RASH   [5]   Past Medical History:   Adenomatous polyp of transverse colon    Colonoscopy 2020    Allergic rhinitis    Anesthesia complication    Asthma (HCC)    spring time    BRCA1 negative    BRCA2 negative    COVID    3/2020, 2021     Family history of breast cancer in first degree relative    Family history of colon cancer    Hx of motion sickness    Moderate persistent asthma without complication (HCC)    PONV (postoperative nausea and vomiting)    Postcoital UTI   [6]   Past Surgical History:  Procedure Laterality Date          Cholecystectomy      Colonoscopy  12= Diverticulosis, Hemorrhoids    Repeat     Colonoscopy  11/11/15= Diverticulosis    Repeat     Colonoscopy  2020    Diverticulosis, Polyp (TA)  Repeat   Needs MAC    Colonoscopy N/A 2020    Procedure: COLONOSCOPY, POSSIBLE BIOPSY, POSSIBLE POLYPECTOMY 79068;  Surgeon: Moshe Sweeney MD;  Location: [facility]    Colonoscopy,diagnostic N/A 2015    Procedure: COLONOSCOPY, POSSIBLE BIOPSY,  POSSIBLE POLYPECTOMY 58186;  Surgeon: Moshe Sweeney MD;  Location: [facility]    Excise breast lesion Right 06/09/2015    Procedure: BIOPSY / LUMPECTOMY BREAST;  Surgeon: Srinivas Butts MD;  Location: [facility]    Excisional biopsy left  11/2017    No evidence of malignancy    Excisional biospy right  02/2013    Fibroadenoma, No evidence of malignancy    Hysterectomy      Lumpectomy left      Lumpectomy right      Needle biopsy left  06/21/2017    No evidence of malignancy    Needle biopsy left  1998    neg    Other surgical history  01/24/2022    Sling Dr. Acosta

## 2025-05-01 LAB
ANION GAP SERPL CALC-SCNC: 8 MMOL/L (ref 0–18)
ANTIBODY SCREEN: NEGATIVE
BASOPHILS # BLD AUTO: 0.03 X10(3) UL (ref 0–0.2)
BASOPHILS NFR BLD AUTO: 0.3 %
BUN BLD-MCNC: 7 MG/DL (ref 9–23)
CALCIUM BLD-MCNC: 8.4 MG/DL (ref 8.7–10.6)
CHLORIDE SERPL-SCNC: 107 MMOL/L (ref 98–112)
CO2 SERPL-SCNC: 24 MMOL/L (ref 21–32)
CREAT BLD-MCNC: 0.86 MG/DL (ref 0.55–1.02)
EGFRCR SERPLBLD CKD-EPI 2021: 80 ML/MIN/1.73M2 (ref 60–?)
EOSINOPHIL # BLD AUTO: 0.01 X10(3) UL (ref 0–0.7)
EOSINOPHIL NFR BLD AUTO: 0.1 %
ERYTHROCYTE [DISTWIDTH] IN BLOOD BY AUTOMATED COUNT: 12.7 %
GLUCOSE BLD-MCNC: 106 MG/DL (ref 70–99)
HCT VFR BLD AUTO: 22.7 % (ref 35–48)
HGB BLD-MCNC: 7.6 G/DL (ref 12–16)
HGB BLD-MCNC: 7.8 G/DL (ref 12–16)
HGB BLD-MCNC: 7.9 G/DL (ref 12–16)
IMM GRANULOCYTES # BLD AUTO: 0.04 X10(3) UL (ref 0–1)
IMM GRANULOCYTES NFR BLD: 0.4 %
LYMPHOCYTES # BLD AUTO: 2.1 X10(3) UL (ref 1–4)
LYMPHOCYTES NFR BLD AUTO: 20.5 %
MAGNESIUM SERPL-MCNC: 1.7 MG/DL (ref 1.6–2.6)
MCH RBC QN AUTO: 29.3 PG (ref 26–34)
MCHC RBC AUTO-ENTMCNC: 34.4 G/DL (ref 31–37)
MCV RBC AUTO: 85.3 FL (ref 80–100)
MONOCYTES # BLD AUTO: 0.92 X10(3) UL (ref 0.1–1)
MONOCYTES NFR BLD AUTO: 9 %
NEUTROPHILS # BLD AUTO: 7.15 X10 (3) UL (ref 1.5–7.7)
NEUTROPHILS # BLD AUTO: 7.15 X10(3) UL (ref 1.5–7.7)
NEUTROPHILS NFR BLD AUTO: 69.7 %
OSMOLALITY SERPL CALC.SUM OF ELEC: 286 MOSM/KG (ref 275–295)
PHOSPHATE SERPL-MCNC: 3.4 MG/DL (ref 2.4–5.1)
PLATELET # BLD AUTO: 140 10(3)UL (ref 150–450)
POTASSIUM SERPL-SCNC: 3.9 MMOL/L (ref 3.5–5.1)
RBC # BLD AUTO: 2.66 X10(6)UL (ref 3.8–5.3)
RH BLOOD TYPE: POSITIVE
RH BLOOD TYPE: POSITIVE
SODIUM SERPL-SCNC: 139 MMOL/L (ref 136–145)
WBC # BLD AUTO: 10.3 X10(3) UL (ref 4–11)

## 2025-05-01 PROCEDURE — 80048 BASIC METABOLIC PNL TOTAL CA: CPT | Performed by: SURGERY

## 2025-05-01 PROCEDURE — 97165 OT EVAL LOW COMPLEX 30 MIN: CPT

## 2025-05-01 PROCEDURE — 86850 RBC ANTIBODY SCREEN: CPT | Performed by: PHYSICIAN ASSISTANT

## 2025-05-01 PROCEDURE — 85018 HEMOGLOBIN: CPT | Performed by: INTERNAL MEDICINE

## 2025-05-01 PROCEDURE — 97535 SELF CARE MNGMENT TRAINING: CPT

## 2025-05-01 PROCEDURE — 86920 COMPATIBILITY TEST SPIN: CPT

## 2025-05-01 PROCEDURE — 84100 ASSAY OF PHOSPHORUS: CPT | Performed by: SURGERY

## 2025-05-01 PROCEDURE — 97161 PT EVAL LOW COMPLEX 20 MIN: CPT

## 2025-05-01 PROCEDURE — 85025 COMPLETE CBC W/AUTO DIFF WBC: CPT | Performed by: SURGERY

## 2025-05-01 PROCEDURE — 85018 HEMOGLOBIN: CPT | Performed by: SURGERY

## 2025-05-01 PROCEDURE — 86901 BLOOD TYPING SEROLOGIC RH(D): CPT | Performed by: PHYSICIAN ASSISTANT

## 2025-05-01 PROCEDURE — 97530 THERAPEUTIC ACTIVITIES: CPT

## 2025-05-01 PROCEDURE — 83735 ASSAY OF MAGNESIUM: CPT | Performed by: SURGERY

## 2025-05-01 PROCEDURE — 36430 TRANSFUSION BLD/BLD COMPNT: CPT

## 2025-05-01 PROCEDURE — 86900 BLOOD TYPING SEROLOGIC ABO: CPT | Performed by: PHYSICIAN ASSISTANT

## 2025-05-01 RX ORDER — TRANEXAMIC ACID 10 MG/ML
1000 INJECTION, SOLUTION INTRAVENOUS ONCE
Status: COMPLETED | OUTPATIENT
Start: 2025-05-01 | End: 2025-05-01

## 2025-05-01 RX ORDER — MAGNESIUM OXIDE 400 MG/1
400 TABLET ORAL ONCE
Status: COMPLETED | OUTPATIENT
Start: 2025-05-01 | End: 2025-05-01

## 2025-05-01 RX ORDER — SODIUM CHLORIDE 9 MG/ML
INJECTION, SOLUTION INTRAVENOUS ONCE
Status: DISCONTINUED | OUTPATIENT
Start: 2025-05-01 | End: 2025-05-02

## 2025-05-01 RX ORDER — TRANEXAMIC ACID 10 MG/ML
1000 INJECTION, SOLUTION INTRAVENOUS ONCE
Status: DISCONTINUED | OUTPATIENT
Start: 2025-05-01 | End: 2025-05-01

## 2025-05-01 RX ORDER — ACETAMINOPHEN 500 MG
1000 TABLET ORAL EVERY 6 HOURS PRN
Status: DISCONTINUED | OUTPATIENT
Start: 2025-05-01 | End: 2025-05-02

## 2025-05-01 RX ORDER — TRAMADOL HYDROCHLORIDE 50 MG/1
50 TABLET ORAL EVERY 6 HOURS PRN
Status: DISCONTINUED | OUTPATIENT
Start: 2025-05-01 | End: 2025-05-02

## 2025-05-01 NOTE — PHYSICAL THERAPY NOTE
PHYSICAL THERAPY EVALUATION - INPATIENT     Room Number: 307/307-A  Evaluation Date: 5/1/2025  Type of Evaluation: Initial  Physician Order: PT Eval and Treat    Presenting Problem: low anterior colon resection  Co-Morbidities : asthma, hx of diverticulitis  Reason for Therapy: Mobility Dysfunction and Discharge Planning  Procedure Performed 4/30/25  Dr Morrison: XI ROBOT-ASSISTED  LOW ANTERIOR COLON RESECTION, RIGID PROCTOSIGMOIDOSCOPY    PHYSICAL THERAPY ASSESSMENT   Patient is a 54 year old female admitted 4/30/2025 for planned abd procedure.   Patient is currently functioning at baseline with bed mobility, transfers, gait, maintaining seated position, and standing prolonged periods. Prior to admission, patient's baseline is indep.     Patient will benefit from continued skilled PT Services with no additional skilled services but increased support at home.    PLAN  Patient has been evaluated and presents with no skilled Physical Therapy needs at this time.  Patient discharged from Physical Therapy services.  Please re-order if a new functional limitation presents during this admission.    PT Device Recommendation: None    GOALS  Patient was able to achieve the following goals ...    Patient was able to transfer At previous, functional level  Safely and independently   Patient able to ambulate on level surfaces At previous, functional level  Safely and independently     HOME SITUATION  Type of Home: House  Home Layout: Two level, Bed/bath upstairs  Stairs to Enter : 1   Railing: No    Stairs to Bedroom: 13    Railing: Yes    Lives With: Spouse    Drives: Yes   Patient Regularly Uses: None     Prior Level of [place]:   Per pt - she lives in two story home with spouse.  Bed/bath upstairs. Ambulates indep at baseline. Has four children in their 20s- one of them is home from  school until Sunday  and  is  able to assist as needed. 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  Has an adjustable bed at home.       SUBJECTIVE  \"The pain takes my breath away\" - in regards to gas pain  in shoulder     OBJECTIVE  Precautions: HOB elevated 30 degrees, Abdominal protective strategies  Fall Risk: Standard fall risk    WEIGHT BEARING RESTRICTION     PAIN ASSESSMENT  Rating: Unable to rate  Location: L shoulder/neck,  incisional  Management Techniques: Activity promotion, Body mechanics, Repositioning    COGNITION  Overall Cognitive Status:  WFL - within functional limits    RANGE OF MOTION AND STRENGTH ASSESSMENT  Upper extremity ROM and strength are within functional limits   Lower extremity ROM is within functional limits   Lower extremity strength is within functional limits     BALANCE  Static Sitting: Normal  Dynamic Sitting: Good  Static Standing: Fair +  Dynamic Standing: Fair    ADDITIONAL TESTS                                    ACTIVITY TOLERANCE                         O2 WALK       NEUROLOGICAL FINDINGS                        AM-PAC '6-Clicks' INPATIENT SHORT FORM - BASIC MOBILITY  How much difficulty does the patient currently have...  Patient Difficulty: Turning over in bed (including adjusting bedclothes, sheets and blankets)?: None   Patient Difficulty: Sitting down on and standing up from a chair with arms (e.g., wheelchair, bedside commode, etc.): None   Patient Difficulty: Moving from lying on back to sitting on the side of the bed?: None   How much help from another person does the patient currently need...   Help from Another: Moving to and from a bed to a chair (including a wheelchair)?: None   Help from Another: Need to walk in hospital room?: None   Help from Another: Climbing 3-5 steps with a railing?: A Little       AM-PAC Score:  Raw Score: 23   Approx Degree of Impairment: 11.2%   Standardized Score (AM-PAC Scale): 56.93   CMS Modifier (G-Code): CI    FUNCTIONAL ABILITY STATUS  Gait Assessment   Functional Mobility/Gait Assessment  Gait Assistance: Independent  Distance (ft): 150  Assistive Device:  None  Pattern: Within Functional Limits    Skilled Therapy Provided   Educated on abd protective strategies- no bending, lifting, twisting  Educated on log roll technique for bed mobility  Encouraged continued out of bed and ambulation at time of discharge- recommend hourly change in positions    Bed mobility via log roll to the left> sidelying to sitting EOB> sit to stand to RW> ambulated 150 feet without device> returned supine in bed at end of session via log roll    Bed Mobility:  Rolling: educated on log roll technique- able to complete indep with vc for sequencing   Supine to sit: indep   Sit to supine: indep     Transfer Mobility:  Sit to stand: indep   Stand to sit: indep  Gait = indep x 150 feet- no gait deviations or loss of balance observed    Therapist's comments:  RN gave clearance to see patient. Discussed role and goal of physical therapy in hospital setting. Spouse and daughter present for session. Pt in agreement to session.     Exercise/Education Provided:  Bed mobility  Body mechanics  Energy conservation  Functional activity tolerated  Gait training  Posture  Transfer training    Patient End of Session: Needs met, Call light within reach, RN aware of session/findings, All patient questions and concerns addressed, Hospital anti-slip socks, SCDs in place, Discussed recommendations with /, In bed, Family present    Patient Evaluation Complexity Level:  History Moderate - 1 or 2 personal factors and/or co-morbidities   Examination of body systems Low -  addressing 1-2 elements   Clinical Presentation Low- Stable   Clinical Decision Making Low Complexity     PT Session Time: 30 minutes  Therapeutic Activity: 15 minutes

## 2025-05-01 NOTE — OCCUPATIONAL THERAPY NOTE
OCCUPATIONAL THERAPY EVALUATION - INPATIENT    Room Number: 307/307-A  Evaluation Date: 5/1/2025     Type of Evaluation: Initial  Presenting Problem: s/p robot assisted low anterior colon resecction right protcosigmoidoscopy    Physician Order: IP Consult to Occupational Therapy  Reason for Therapy:  ADL/IADL Dysfunction and Discharge Planning    OCCUPATIONAL THERAPY ASSESSMENT   Patient is a 54 year old female admitted on 4/30/2025 with Presenting Problem: s/p robot assisted low anterior colon resecction right protcosigmoidoscopy.   Co-Morbidities : asthma, hx of diverticulitis      Patient participated well in eval, able to complete ADLs and functional mobility/transfers safely and independently without device. All necessary education completed and Pt verbalized/demo'd understanding as appropriate. No further questions/concerns voiced. Family will be present to assist as needed. No OT needs anticipated at [facility].     Recommendations for nursing staff:   Transfers: SBA  Toileting location: Toilet    EVALUATION SESSION:  Patient at start of session: semi-supine    FUNCTIONAL TRANSFER ASSESSMENT  Sit to Stand: Edge of Bed  Edge of Bed: Independent  Toilet Transfer: Independent    BED MOBILITY  Rolling: Independent  Supine to Sit : Independent  Sit to Supine (OT): Independent  Scooting: IND    BALANCE ASSESSMENT  Static Sitting: Independent  Static Standing: Independent    FUNCTIONAL ADL ASSESSMENT  LB Dressing Seated: Independent  LB Dressing Standing: Independent  Toileting Seated: Independent  Toileting Standing: Independent    ACTIVITY TOLERANCE: Tolerates session on room air. Does report mild dizziness/LH with mobility, reports it is better than when she has gotten up before. /79 after mobility in hallway.     COGNITION  Overall Cognitive Status:  WFL - within functional limits    EDUCATION PROVIDED  Patient Education : Role of Occupational Therapy; Plan of Care; Discharge Recommendations; Functional Transfer  Techniques; DME Recommendations; Fall Prevention; Surgical Precautions; Energy Conservation; Proper Body Mechanics; Abdominal Protective Strategies  Patient's Response to Education: Verbalized Understanding; Returned Demonstration  Family/Caregiver Education : Role of Occupational Therapy; Plan of Care  Family/Caregiver's Response to Education: Verbalized Understanding    Therapist comments: Education given re: log rolling, abdominal sparing precautions, and adaptive/compensatory strategies to adhere to precautions during Adls and mobility/transfers.     Patient End of Session: In bed, Needs met, RN aware of session/findings, Call light within reach, All patient questions and concerns addressed, Hospital anti-slip socks, Family present    OCCUPATIONAL PROFILE    HOME SITUATION  Type of Home: House  Home Layout: Two level, Bed/bath upstairs  Lives With: Spouse  Toilet and Equipment: Comfort height toilet  Shower/Tub and Equipment: Shower chair, Walk-in shower  Other Equipment: None  Occupation/Status: pharmacist in LTC  Drives: Yes  Patient Regularly Uses: None    Prior Level of Function: Pt is typically independent with all aspects of mobility and self-cares without device.    SUBJECTIVE  \"I've never had this shoulder pain after a surgery.\"    PAIN ASSESSMENT  Ratin  Location: shoulder, abdomen  Management Techniques: Activity promotion, Body mechanics, Breathing techniques    OBJECTIVE  Precautions: HOB elevated 30 degrees, Abdominal protective strategies  Fall Risk: Standard fall risk    AM-PAC ‘6-Clicks’ Inpatient Daily Activity Short Form  -   Putting on and taking off regular lower body clothing?: None  -   Bathing (including washing, rinsing, drying)?: None  -   Toileting, which includes using toilet, bedpan or urinal? : None  -   Putting on and taking off regular upper body clothing?: None  -   Taking care of personal grooming such as brushing teeth?: None  -   Eating meals?: None    AM-PAC Score:  Score:  24  Approx Degree of Impairment: 0%  Standardized Score (AM-PAC Scale): 57.54    PLAN   Patient has been evaluated and presents with no skilled Occupational Therapy needs at this time.  Patient discharged from Occupational Therapy services.  Please re-order if a new functional limitation presents during this admission.    OT Device Recommendations: None    Patient Evaluation Complexity Level:   Occupational Profile/Medical History LOW - Brief history including review of medical or therapy records    Specific performance deficits impacting engagement in ADL/IADL LOW  1 - 3 performance deficits    Client Assessment/Performance Deficits MODERATE - Comorbidities and min to mod modifications of tasks    Clinical Decision Making LOW - Analysis of occupational profile, problem-focused assessments, limited treatment options    Overall Complexity LOW     OT Session Time: 25 minutes  Self-Care Home Management: 10 minutes

## 2025-05-01 NOTE — PROGRESS NOTES
[facility] Hospitalist Progress Note       SUBJECTIVE:  Pt had some brbpr yesterday and again this AM 2 episodes   - feels dizzy  - no chest pain no SOB no abdominal pain   Hgb 7.8 repeat pending       OBJECTIVE:  Scheduled Meds: Scheduled Medications[1]  Continuous Infusions: Medication Infusions[2]  PRN Meds: PRN Medications[3]    Vitals  Vitals:    05/01/25 0852   BP: 109/79   Pulse: 88   Resp: 16   Temp: 97.7 °F (36.5 °C)         Exam   Gen-    no acute distress, alert and oriented x 3   RESP-   Lungs CTA, normal respiratory effort  CV-      Heart RRR, no mgr  Abd-    soft, nondistended, nontender, bowel sounds present  Skin-    no rash  Neuro-  no focal neurologic deficits  Ext-      No edema in extremities   Psych- alert and oriented x 3     Labs:     Recent Labs   Lab 05/01/25  0550   WBC 10.3   HGB 7.8*   MCV 85.3   .0*       Recent Labs   Lab 05/01/25  0550      K 3.9      CO2 24.0   BUN 7*   CREATSERUM 0.86   CA 8.4*   MG 1.7   PHOS 3.4   *            Recent Labs   Lab 04/30/25  1759   PGLU 260*       AP:     54 year old female with hx of asthma, hx of diverticulitis, here for scheduled surgery.  Pt is s/p robot assisted low anterior colon resecction right protcosigmoidoscopy on 4/30.            Plan:    Post op pain  - Iv pain meds, switch to orals as able  - diet per surgery   - dvt ppx when ok with surgery -holding given anemia and brbpr    Anemia   - suspecting abla having some BRBPR w drop in hgb  - recheck bp, recheck hgb     Asthma  - home meds  - no s/s of exacerbation         Prophylaxis:  DVT: SCD         Shiloh Vargas MD               [1]    famotidine  20 mg Oral BID    Or    famotidine  20 mg Intravenous BID    magnesium oxide  400 mg Oral Daily    enoxaparin  40 mg Subcutaneous Daily   [2]    sodium chloride 20 mL/hr at 04/30/25 0635    lactated ringers     [3]   polyethylene glycol (PEG 3350)    sennosides    oxyCODONE **OR** oxyCODONE    HYDROmorphone **OR** HYDROmorphone     ondansetron    acetaminophen

## 2025-05-01 NOTE — PROGRESS NOTES
A&Ox4. RA. VSS. Pain managed per MAR. Denies nausea and SOB. Tolerating a clear diet. Will advance to soft in the morning. Lap sitesx4 and transverse CDI. Poole intact- draining clear yellow urine. Safety measures in place. All questions and concerns addressed. Call light within reach.

## 2025-05-01 NOTE — PROGRESS NOTES
Notified FÁTIMA Myrick, that the patient reported having a headache after the Tranexamic Acid infusion was completed. She also reported feeling woozy and weird. PA stated to discontinue the second dose of the Tranexamic Acid.

## 2025-05-01 NOTE — PROGRESS NOTES
[facility]  Progress Note    Melyssa Juan Patient Status:  Inpatient    1970 MRN [id number]   Location [facility] 3NW-A Attending Haris Morrison MD   Hosp Day # 1 PCP Adan Samano MD     Subjective:    Patient noted having multiple episodes of blood per rectum since surgery  Hgb drop from 13-->7.8 this morning  Now with lightheadedness   BP in room 103/64 HR 93  Family at bedside  She did tolerated diet, no nausea  Abdominal/incisional pain controlled     Objective/Physical Exam:    Vital Signs:  Blood pressure 109/79, pulse 88, temperature 97.7 °F (36.5 °C), temperature source Oral, resp. rate 16, height 5' 7\" (1.702 m), weight 180 lb (81.6 kg), SpO2 98%, not currently breastfeeding.    General:  Alert, orientated x3.  Cooperative.  No apparent distress.  Abdomen:  incisions intact, aquacel intact, soft, mild incisional tenderness     Labs:  Reviewed    Lab Results   Component Value Date    WBC 10.3 2025    HGB 7.8 2025    HCT 22.7 2025    .0 2025    CREATSERUM 0.86 2025    BUN 7 2025     2025    K 3.9 2025     2025    CO2 24.0 2025     2025    CA 8.4 2025    MG 1.7 2025    PHOS 3.4 2025    PGLU 260 2025       Problem List:  Problem List[1]    Impression:     55 y/o POD # 1 robotic assisted low anterior colon resection  -hgb 7.8  With lightheaded sx, vitals stable  Tolerating diet  Passing bloody BM     Plan:    Stat hgb   If hgb < 7 will transfuse 1unit PRBC  Reviewed SCDs hold lovenox/toradol  Changed PO pain medications today  Reviewed will hold off on ambulation until improved  Continue close observation       FÁTIMA Greer  [facility] Surgery  2025         [1]   Patient Active Problem List  Diagnosis    Family history of breast cancer in first degree relative    Family history of colon cancer    Hemorrhoids, unspecified hemorrhoid type     Eczema, unspecified type    Dupuytren's contracture of left hand    Seasonal allergic rhinitis due to pollen    Allergic conjunctivitis of both eyes    Allergic rhinitis, unspecified seasonality, unspecified trigger    Acute pain of right shoulder    Adenomatous polyp of transverse colon    Moderate persistent asthma without complication (HCC)    Postcoital UTI    Family history of malignant neoplasm of prostate    Family history of genetic disease carrier

## 2025-05-02 VITALS
RESPIRATION RATE: 18 BRPM | WEIGHT: 180 LBS | DIASTOLIC BLOOD PRESSURE: 53 MMHG | TEMPERATURE: 98 F | HEIGHT: 67 IN | HEART RATE: 87 BPM | OXYGEN SATURATION: 95 % | SYSTOLIC BLOOD PRESSURE: 101 MMHG | BODY MASS INDEX: 28.25 KG/M2

## 2025-05-02 LAB
ANION GAP SERPL CALC-SCNC: 4 MMOL/L (ref 0–18)
BLOOD TYPE BARCODE: 5100
BUN BLD-MCNC: 10 MG/DL (ref 9–23)
CALCIUM BLD-MCNC: 8.4 MG/DL (ref 8.7–10.6)
CHLORIDE SERPL-SCNC: 109 MMOL/L (ref 98–112)
CO2 SERPL-SCNC: 29 MMOL/L (ref 21–32)
CREAT BLD-MCNC: 0.82 MG/DL (ref 0.55–1.02)
DEPRECATED HBV CORE AB SER IA-ACNC: 89 NG/ML (ref 50–306)
EGFRCR SERPLBLD CKD-EPI 2021: 84 ML/MIN/1.73M2 (ref 60–?)
ERYTHROCYTE [DISTWIDTH] IN BLOOD BY AUTOMATED COUNT: 12.9 %
GLUCOSE BLD-MCNC: 101 MG/DL (ref 70–99)
HCT VFR BLD AUTO: 22 % (ref 35–48)
HCT VFR BLD AUTO: 22.5 % (ref 35–48)
HGB BLD-MCNC: 7.4 G/DL (ref 12–16)
HGB BLD-MCNC: 7.9 G/DL (ref 12–16)
IRON SATN MFR SERPL: 7 % (ref 15–50)
IRON SERPL-MCNC: 17 UG/DL (ref 50–170)
MAGNESIUM SERPL-MCNC: 1.8 MG/DL (ref 1.6–2.6)
MCH RBC QN AUTO: 29.2 PG (ref 26–34)
MCHC RBC AUTO-ENTMCNC: 33.6 G/DL (ref 31–37)
MCV RBC AUTO: 87 FL (ref 80–100)
OSMOLALITY SERPL CALC.SUM OF ELEC: 293 MOSM/KG (ref 275–295)
PLATELET # BLD AUTO: 119 10(3)UL (ref 150–450)
POTASSIUM SERPL-SCNC: 3.9 MMOL/L (ref 3.5–5.1)
RBC # BLD AUTO: 2.53 X10(6)UL (ref 3.8–5.3)
SODIUM SERPL-SCNC: 142 MMOL/L (ref 136–145)
TOTAL IRON BINDING CAPACITY: 241 UG/DL (ref 250–425)
TRANSFERRIN SERPL-MCNC: 180 MG/DL (ref 250–380)
UNIT VOLUME: 350 ML
WBC # BLD AUTO: 9.3 X10(3) UL (ref 4–11)

## 2025-05-02 PROCEDURE — 83540 ASSAY OF IRON: CPT | Performed by: INTERNAL MEDICINE

## 2025-05-02 PROCEDURE — 83735 ASSAY OF MAGNESIUM: CPT | Performed by: SURGERY

## 2025-05-02 PROCEDURE — 82728 ASSAY OF FERRITIN: CPT | Performed by: INTERNAL MEDICINE

## 2025-05-02 PROCEDURE — 85027 COMPLETE CBC AUTOMATED: CPT | Performed by: PHYSICIAN ASSISTANT

## 2025-05-02 PROCEDURE — 85014 HEMATOCRIT: CPT | Performed by: SURGERY

## 2025-05-02 PROCEDURE — 85018 HEMOGLOBIN: CPT | Performed by: SURGERY

## 2025-05-02 PROCEDURE — 80048 BASIC METABOLIC PNL TOTAL CA: CPT | Performed by: INTERNAL MEDICINE

## 2025-05-02 PROCEDURE — 83550 IRON BINDING TEST: CPT | Performed by: INTERNAL MEDICINE

## 2025-05-02 RX ORDER — FERROUS SULFATE 325(65) MG
325 TABLET, DELAYED RELEASE (ENTERIC COATED) ORAL EVERY OTHER DAY
Qty: 15 TABLET | Refills: 0 | Status: SHIPPED | OUTPATIENT
Start: 2025-05-02 | End: 2025-06-01

## 2025-05-02 RX ORDER — MAGNESIUM OXIDE 400 MG/1
400 TABLET ORAL ONCE
Status: COMPLETED | OUTPATIENT
Start: 2025-05-02 | End: 2025-05-02

## 2025-05-02 RX ORDER — TRAMADOL HYDROCHLORIDE 50 MG/1
50 TABLET ORAL EVERY 6 HOURS PRN
Qty: 24 TABLET | Refills: 0 | Status: SHIPPED | OUTPATIENT
Start: 2025-05-02 | End: 2025-05-02

## 2025-05-02 NOTE — DISCHARGE INSTRUCTIONS
Start oral iron pills every other day  -> these may constipate you so take as needed miralax    Continue soft diet until follow up appointment.

## 2025-05-02 NOTE — DISCHARGE SUMMARY
[facility] Internal Medicine Hospitalist Discharge Summary     Patient ID:  Melyssa Juan  54 year old  12/4/1970    Admit date: 4/30/2025    Discharge date: 5/2/25    Attending Physician: Haris Morrison MD     Primary Care Physician: Adan Samano MD     Discharge Diagnoses: Diverticulitis s/p Low Anterior Colon Resection, R Proctosigmoidoscopy    Discharge Condition: stable    Important Follow Ups:  - PCP: 1-2 weeks  - Consults: Surgery as recommended    Incidental Findings: none    Hospital Course:      54 year old female with hx of asthma, hx of diverticulitis, here for scheduled surgery.  Pt is s/p robot assisted low anterior colon resecction right protcosigmoidoscopy on 4/30.      Plan:    Diverticulitis s/p Low Anterior Colon Resection, R Proctosigmoidoscopy on 4/30  POD2  - Cleared for discharge from surgery perspective, follow up in office  - Discharge HgB: 7.9     Acute Blood Loss Anemia - stabilized  MARISOL  Plan:  - Discharge HgB:7.9  - Discharge on Ferrous Sulfate every other day, short course ~ 1 month and can recheck labs     Asthma  - home meds     Consults: IP CONSULT TO RESPIRATORY CARE  IP CONSULT TO HOSPITALIST    Operative Procedures: Procedure(s) (LRB):  XI ROBOT-ASSISTED  LOW ANTERIOR COLON RESECTION, RIGID PROCTOSIGMOIDOSCOPY (N/A)       Patient instructions:      I as the attending physician reconciled the current and discharge medications on day of discharge.     Current Discharge Medication List        START taking these medications    Details   traMADol 50 MG Oral Tab Take 1 tablet (50 mg total) by mouth every 6 (six) hours as needed.      ferrous sulfate 325 (65 FE) MG Oral Tab EC Take 1 tablet (325 mg total) by mouth every other day.           CONTINUE these medications which have NOT CHANGED    Details   Levocetirizine Dihydrochloride 5 MG Oral Tab Take 2 tablets (10 mg total) by mouth in the morning and 2 tablets (10 mg  total) before bedtime.      CLOBETASOL PROPIONATE 0.05 % External Cream APPLY 1 APPLICATION TOPICALLY 2 (TWO) TIMES DAILY.      Hydrocortisone (PROCTOSOL HC) 2.5 % External Cream Place 1 Application rectally 2 (two) times daily.      Nitrofurantoin Monohyd Macro 100 MG Oral Cap Take 1 po after intercourse to prevent UTI      Albuterol Sulfate  (90 Base) MCG/ACT Inhalation Aero Soln Inhale 2 puffs into the lungs every 4 (four) hours as needed for Wheezing.           STOP taking these medications       EPINEPHrine (AUVI-Q) 0.3 MG/0.3ML Injection Solution Auto-injector              Activity: activity as tolerated  Diet:  soft diet and advance as tolerated  Wound Care: as directed  Code Status: No Order      Exam on day of discharge:     Vitals:    05/02/25 0601   BP: 115/58   Pulse: 103   Resp: 15   Temp: 99.2 °F (37.3 °C)       Physical Exam:   General: no acute distress  Heart: RRR  Lungs: CTAB  Abd: Soft, NT, ND, incisions noted, c/d/I, no evidence of infection/hematoma  Neuro: no focal deficits      Total time coordinating care for discharge: Greater than 30 minutes    Jonathan Lerner D.O.  [facility] Hospitalist     Supplementary Documentation:   DVT Mechanical Prophylaxis:   SCDs, Early ambuation  DVT Pharmacologic Prophylaxis   Medication   None                Code Status: Not on file  Poole: No urinary catheter in place  Poole Duration (in days):   Central line:    KOLTON: 5/2/2025

## 2025-05-02 NOTE — PROGRESS NOTES
NURSING DISCHARGE NOTE    Discharged Home via Wheelchair.  Accompanied by Support staff.  Belongings Taken by patient/family.    IV removed for discharge. Discharge education provided. All questions and concerns addressed.

## 2025-05-02 NOTE — PLAN OF CARE
Assumed care at 1930  Pain medicine given.  30 ml dark red rectal bleeding up to this time.Will continue to monitor.  Voiding.  Tolerating soft diet.  Labs in am.  Problem: Patient/Family Goals  Goal: Patient/Family Long Term Goal  Description: Patient's Long Term Goal: Discharge Home  Interventions: Pain Tolerance, Diet Tolerance, Return to normal ADL's   See additional Care Plan goals for specific interventions  Outcome: Progressing  Goal: Patient/Family Short Term Goal  Description: Patient's Short Term Goal: Prepare to Discharge Home  Interventions: Transition from IV to oral pain medications, advance diet as tolerated, encourage ambulation   See additional Care Plan goals for specific interventions  Outcome: Progressing     Problem: PAIN - ADULT  Goal: Verbalizes/displays adequate comfort level or patient's stated pain goal  Description: INTERVENTIONS:- Encourage pt to monitor pain and request assistance- Assess pain using appropriate pain scale- Administer analgesics based on type and severity of pain and evaluate response- Implement non-pharmacological measures as appropriate and evaluate response- Consider cultural and social influences on pain and pain management- Manage/alleviate anxiety- Utilize distraction and/or relaxation techniques- Monitor for opioid side effects- Notify MD/LIP if interventions unsuccessful or patient reports new pain- Anticipate increased pain with activity and pre-medicate as appropriate  Outcome: Progressing     Problem: HEMATOLOGIC - ADULT  Goal: Maintains hematologic stability  Description: INTERVENTIONS- Assess for signs and symptoms of bleeding or hemorrhage- Monitor labs and vital signs for trends- Administer supportive blood products/factors, fluids and medications as ordered and appropriate- Administer supportive blood products/factors as ordered and appropriate  Outcome: Progressing  Goal: Free from bleeding injury  Description: (Example usage: patient with low  platelets)INTERVENTIONS:- Avoid intramuscular injections, enemas and rectal medication administration- Ensure safe mobilization of patient- Hold pressure on venipuncture sites to achieve adequate hemostasis- Assess for signs and symptoms of internal bleeding- Monitor lab trends- Patient is to report abnormal signs of bleeding to staff- Avoid use of toothpicks and dental floss- Use electric shaver for shaving- Use soft bristle tooth brush- Limit straining and forceful nose blowing  Outcome: Progressing

## 2025-05-02 NOTE — PROGRESS NOTES
[facility]  Progress Note    Melyssa Juan Patient Status:  Inpatient    1970 MRN [id number]   Location [facility] 3NW-A Attending Haris Morrison MD   Hosp Day # 2 PCP Adan Samano MD     Subjective:  ***    Objective/Physical Exam:  General: Alert, orientated x3.  Cooperative.  No apparent distress.  Vital Signs:  Blood pressure 115/58, pulse 103, temperature 99.2 °F (37.3 °C), temperature source Oral, resp. rate 15, height 5' 7\" (1.702 m), weight 180 lb (81.6 kg), SpO2 95%, not currently breastfeeding.  HEENT: Exam is unremarkable.  Without scleral icterus.  Mucous membranes are moist. Oropharynx is clear.  Lungs: Clear to auscultation bilaterally.  Cardiac: Regular rate and rhythm. No murmur.  Abdomen:  ***  Extremities:  No lower extremity edema noted.  Without clubbing or cyanosis.    Skin: Normal texture and turgor.  Neurologic: Cranial nerves are grossly intact.  Motor strength and sensory examination is grossly normal.  No focal neurologic deficit.    Labs:  Lab Results   Component Value Date    WBC 9.3 2025    HGB 7.4 2025    HCT 22.0 2025    .0 2025    CREATSERUM 0.82 2025    BUN 10 2025     2025    K 3.9 2025     2025    CO2 29.0 2025     2025    CA 8.4 2025    MG 1.8 2025       Assessment/Plan:  ***    Haris Morrison MD  2025  6:22 AM

## 2025-05-02 NOTE — PROGRESS NOTES
A&Ox4. VSS. RA. .  Cardiac: WDL  GI: Abdomen soft, nondistended. Passing gas.  Denies nausea, SOB, and chest pain.   : Voids.  Medicated per MAR.   Up ad wyatt.  Incisions: X4 lap sites with skin glue C/D/I and no drainage noted, transverse incision with aquacel with old drainage noted  Diet: tolerating low fiber/soft.  Saline locked per order.   All appropriate safety measures in place. All questions and concerns addressed. Plan of care ongoing.

## 2025-05-02 NOTE — PROGRESS NOTES
[facility]  Progress Note    Melyssa Juan Patient Status:  Inpatient    1970 MRN [id number]   Location [facility] 3NW-A Attending Haris Morrison MD   Hosp Day # 2 PCP Adan Samano MD     Subjective:  Feeling much better this morning.  No further bleeding noted since yesterday afternoon.    Objective/Physical Exam:  General: Alert, orientated x3.  Cooperative.  No apparent distress.  Vital Signs:  Blood pressure 115/58, pulse 103, temperature 99.2 °F (37.3 °C), temperature source Oral, resp. rate 15, height 5' 7\" (1.702 m), weight 180 lb (81.6 kg), SpO2 95%, not currently breastfeeding.  HEENT: Exam is unremarkable.  Without scleral icterus.  Mucous membranes are moist. Oropharynx is clear.  Lungs: Clear to auscultation bilaterally.  Cardiac: Regular rate and rhythm. No murmur.  Abdomen: Soft, nondistended minimally tender wounds intact.  Extremities:  No lower extremity edema noted.  Without clubbing or cyanosis.    Skin: Normal texture and turgor.  Neurologic: Cranial nerves are grossly intact.  Motor strength and sensory examination is grossly normal.  No focal neurologic deficit.    Labs:  Lab Results   Component Value Date    WBC 9.3 2025    HGB 7.4 2025    HCT 22.0 2025    .0 2025    CREATSERUM 0.82 2025    BUN 10 2025     2025    K 3.9 2025     2025    CO2 29.0 2025     2025    CA 8.4 2025    MG 1.8 2025       Assessment/Plan:  Postop day #2 following robotic assisted low anterior colon resection.  Anastomotic bleeding seems to have subsided.  May resume diet and activity.  Hemoglobin stable this morning.  Possible home later today if she meets criteria    Haris Morrison MD  2025  6:26 AM

## 2025-05-02 NOTE — PROGRESS NOTES
Dr. Morrison notified that the patient's post transfusion Hemoglobin level is 7.9. The patient reports that she has not had any rectal bleeding since 1620.

## 2025-05-06 NOTE — PAYOR COMM NOTE
--------------  DISCHARGE REVIEW    Payor: BETHANY SALGUERO  Subscriber #:  [id number]  Authorization Number: [id number]    Admit date: 4/30/25  Admit time:   5:37 AM  Discharge Date: 5/2/2025  3:19 PM     Admitting Physician: Haris Morrison MD  Attending Physician:  No att. providers found  Primary Care Physician: Adan Samano MD    Pre-Operative Diagnosis: DIVERTICULITIS     Post-Operative Diagnosis: DIVERTICULITIS     Procedure Performed: XI ROBOT-ASSISTED  LOW ANTERIOR COLON RESECTION, RIGID PROCTOSIGMOIDOSCOPY     Surgeon: Haris Morrison MD     Assistant(s):  Surgical Assistant.: Jade Garcia RSA     The surgical Assistant was necessary for this procedure.  The assistant was involved in patient positioning, instrument exchange, improving exposure optimizing visualization of patient's safety, and closure.  The duties of the assistant allowed for reduced risk of the performance of this procedure and care of this patient      Discharge Summary Notes        Discharge Summary signed by Jonathan Lerner DO at 5/2/2025  2:27 PM       Author: Jonathan Lerner DO Specialty: Internal Medicine Author Type: Physician    Filed: 5/2/2025  2:27 PM Date of Service: 5/2/2025 11:57 AM Status: Signed    : Jonathan Lerner DO (Physician)                                                          [facility] Internal Medicine Hospitalist Discharge Summary     Patient ID:  Melyssa Juan  54 year old  12/4/1970    Admit date: 4/30/2025    Discharge date: 5/2/25    Attending Physician: Haris Morrison MD     Primary Care Physician: Adan Samano MD     Discharge Diagnoses: Diverticulitis s/p Low Anterior Colon Resection, R Proctosigmoidoscopy    Discharge Condition: stable    Important Follow Ups:  - PCP: 1-2 weeks  - Consults: Surgery as recommended    Incidental Findings: none    Hospital Course:      54 year old female with hx of asthma, hx of diverticulitis, here for scheduled surgery.  Pt is s/p robot assisted low  anterior colon resecction right protcosigmoidoscopy on 4/30.      Plan:    Diverticulitis s/p Low Anterior Colon Resection, R Proctosigmoidoscopy on 4/30  POD2  - Cleared for discharge from surgery perspective, follow up in office  - Discharge HgB: 7.9     Acute Blood Loss Anemia - stabilized  MARISOL  Plan:  - Discharge HgB:7.9  - Discharge on Ferrous Sulfate every other day, short course ~ 1 month and can recheck labs     Asthma  - home meds     Consults: IP CONSULT TO RESPIRATORY CARE  IP CONSULT TO HOSPITALIST    Operative Procedures: Procedure(s) (LRB):  XI ROBOT-ASSISTED  LOW ANTERIOR COLON RESECTION, RIGID PROCTOSIGMOIDOSCOPY (N/A)       Patient instructions:      I as the attending physician reconciled the current and discharge medications on day of discharge.     Current Discharge Medication List        START taking these medications    Details   traMADol 50 MG Oral Tab Take 1 tablet (50 mg total) by mouth every 6 (six) hours as needed.      ferrous sulfate 325 (65 FE) MG Oral Tab EC Take 1 tablet (325 mg total) by mouth every other day.           CONTINUE these medications which have NOT CHANGED    Details   Levocetirizine Dihydrochloride 5 MG Oral Tab Take 2 tablets (10 mg total) by mouth in the morning and 2 tablets (10 mg total) before bedtime.      CLOBETASOL PROPIONATE 0.05 % External Cream APPLY 1 APPLICATION TOPICALLY 2 (TWO) TIMES DAILY.      Hydrocortisone (PROCTOSOL HC) 2.5 % External Cream Place 1 Application rectally 2 (two) times daily.      Nitrofurantoin Monohyd Macro 100 MG Oral Cap Take 1 po after intercourse to prevent UTI      Albuterol Sulfate  (90 Base) MCG/ACT Inhalation Aero Soln Inhale 2 puffs into the lungs every 4 (four) hours as needed for Wheezing.           STOP taking these medications       EPINEPHrine (AUVI-Q) 0.3 MG/0.3ML Injection Solution Auto-injector              Activity: activity as tolerated  Diet:  soft diet and advance as tolerated  Wound Care: as directed  Code  Status: No Order      Exam on day of discharge:     Vitals:    05/02/25 0601   BP: 115/58   Pulse: 103   Resp: 15   Temp: 99.2 °F (37.3 °C)       Physical Exam:   General: no acute distress  Heart: RRR  Lungs: CTAB  Abd: Soft, NT, ND, incisions noted, c/d/I, no evidence of infection/hematoma  Neuro: no focal deficits      Total time coordinating care for discharge: Greater than 30 minutes    Jonathan Lerner D.O.  [facility] Hospitalist     Supplementary Documentation:   DVT Mechanical Prophylaxis:   SCDs, Early ambuation  DVT Pharmacologic Prophylaxis   Medication   None                Code Status: Not on file  Poole: No urinary catheter in place  Poole Duration (in days):   Central line:    KOLTON: 5/2/2025            Electronically signed by Jonathan Lerner DO on 5/2/2025  2:27 PM         5/1    Subjective:     Patient noted having multiple episodes of blood per rectum since surgery  Hgb drop from 13-->7.8 this morning  Now with lightheadedness   BP in room 103/64 HR 93  Family at bedside  She did tolerated diet, no nausea  Abdominal/incisional pain controlled      Objective/Physical Exam:     Vital Signs:  Blood pressure 109/79, pulse 88, temperature 97.7 °F (36.5 °C), temperature source Oral, resp. rate 16, height 5' 7\" (1.702 m), weight 180 lb (81.6 kg), SpO2 98%, not currently breastfeeding.     General:  Alert, orientated x3.  Cooperative.  No apparent distress.  Abdomen:  incisions intact, aquacel intact, soft, mild incisional tenderness      Labs:  Reviewed           Lab Results   Component Value Date     WBC 10.3 05/01/2025     HGB 7.8 05/01/2025     HCT 22.7 05/01/2025     .0 05/01/2025     CREATSERUM 0.86 05/01/2025     BUN 7 05/01/2025      05/01/2025     K 3.9 05/01/2025      05/01/2025     CO2 24.0 05/01/2025      05/01/2025     CA 8.4 05/01/2025     MG 1.7 05/01/2025     PHOS 3.4 05/01/2025     PGLU 260 04/30/2025        Impression:      53 y/o POD # 1 robotic assisted low  anterior colon resection  -hgb 7.8  With lightheaded sx, vitals stable  Tolerating diet  Passing bloody BM      Plan:     Stat hgb   If hgb < 7 will transfuse 1unit PRBC  Reviewed SCDs hold lovenox/toradol  Changed PO pain medications today  Reviewed will hold off on ambulation until improved  Continue close observation         FÁTIMA Greer  [facility]  General Surgery  5/1/2025               Cosigned by: Haris Morrison MD at 5/1/2025  1:24 PM   Electronically signed by Haris Morrison MD at 5/1/2025  1:24 PM

## (undated) DEVICE — 40580 - THE PINK PAD - ADVANCED TRENDELENBURG POSITIONING KIT: Brand: 40580 - THE PINK PAD - ADVANCED TRENDELENBURG POSITIONING KIT

## (undated) DEVICE — MEGA SUTURECUT ND: Brand: ENDOWRIST

## (undated) DEVICE — HUNTER GASPER TIP, DISPOSABLE: Brand: RENEW

## (undated) DEVICE — ANTIBACTERIAL UNDYED BRAIDED (POLYGLACTIN 910), SYNTHETIC ABSORBABLE SUTURE: Brand: COATED VICRYL

## (undated) DEVICE — VESSEL SEALER EXTEND: Brand: ENDOWRIST

## (undated) DEVICE — LAPAROSCOPIC ACCESS SYSTEM: Brand: ALEXIS LAPAROSCOPIC SYSTEM

## (undated) DEVICE — SEAL

## (undated) DEVICE — ADHESIVE SKIN TOP FOR WND CLSR DERMBND ADV

## (undated) DEVICE — GOWN,SIRUS,FABRNF,XL,20/CS: Brand: MEDLINE

## (undated) DEVICE — SUT COAT VCRL + 0 54IN ABSRB UD ANTIBACT

## (undated) DEVICE — BLADELESS OBTURATOR: Brand: WECK VISTA

## (undated) DEVICE — ARM DRAPE

## (undated) DEVICE — SIGMOIDOSCOPE LIGHTED BIOSEAL

## (undated) DEVICE — TIP COVER ACCESSORY

## (undated) DEVICE — COLUMN DRAPE

## (undated) DEVICE — STANDARD HYPODERMIC NEEDLE,POLYPROPYLENE HUB: Brand: MONOJECT

## (undated) DEVICE — STAPLER 60 RELOAD BLUE: Brand: SUREFORM

## (undated) DEVICE — AIRSEAL TRI-LUMEN LILTERED TUBE SET: Brand: AIRSEAL

## (undated) DEVICE — COVER,MAYO STAND,STERILE: Brand: MEDLINE

## (undated) DEVICE — DRAPE,TOP,102X53,STERILE: Brand: MEDLINE

## (undated) DEVICE — SUT PDS II 0 36IN CT-1 ABSRB VLT L36MM 1/2

## (undated) DEVICE — DRESSING AQUACEL W/ SILVER 3.5X6 IN

## (undated) DEVICE — TROCAR: Brand: KII FIOS FIRST ENTRY

## (undated) DEVICE — SUT PERMA- 3-0 30IN SH NABSRB BLK 26MM 1/2

## (undated) DEVICE — VIOLET BRAIDED (POLYGLACTIN 910), SYNTHETIC ABSORBABLE SUTURE: Brand: COATED VICRYL

## (undated) DEVICE — MONOPOLAR CURVED SCISSORS: Brand: ENDOWRIST

## (undated) DEVICE — AIRSEAL 5 MM ACCESS PORT AND LOW PROFILE OBTURATOR WITH BLADELESS OPTICAL TIP, 120 MM LENGTH: Brand: AIRSEAL

## (undated) DEVICE — PACK CDS LAP COLON

## (undated) DEVICE — TIP-UP FENESTRATED GRASPER: Brand: ENDOWRIST

## (undated) DEVICE — CIRCULAR MECH XL SEAL 29MM

## (undated) DEVICE — COVER,LIGHT,CAMERA,HARD,1/PK,STRL: Brand: MEDLINE

## (undated) DEVICE — STAPLER 60: Brand: SUREFORM

## (undated) DEVICE — FENESTRATED BIPOLAR FORCEPS: Brand: ENDOWRIST

## (undated) DEVICE — LAPCLINCH GRASPER TIP, DISPOSABLE: Brand: RENEW

## (undated) DEVICE — TRAY CATH 16FR F INCL BARDX IC COMPLT CARE

## (undated) DEVICE — LAPAROVUE VISIBILITY SYSTEM LAPAROSCOPIC SOLUTIONS: Brand: LAPAROVUE

## (undated) DEVICE — ABSORBABLE WOUND CLOSURE DEVICE: Brand: V-LOC 180

## (undated) DEVICE — SKN PREP SPNG STKS PVP PNT STR: Brand: MEDLINE INDUSTRIES, INC.

## (undated) DEVICE — SUT MCRYL 4-0 18IN PS-2 ABSRB UD 19MM 3/8 CIR

## (undated) DEVICE — GLOVE SUR 8 SENSICARE PI PIP CRM PWD F

## (undated) DEVICE — SYRINGE MED 20ML STD CLR PLAS LL TIP N CTRL